# Patient Record
Sex: MALE | Race: WHITE | NOT HISPANIC OR LATINO | Employment: OTHER | URBAN - METROPOLITAN AREA
[De-identification: names, ages, dates, MRNs, and addresses within clinical notes are randomized per-mention and may not be internally consistent; named-entity substitution may affect disease eponyms.]

---

## 2017-10-17 ENCOUNTER — OFFICE VISIT (OUTPATIENT)
Dept: GASTROENTEROLOGY | Facility: CLINIC | Age: 67
End: 2017-10-17

## 2017-10-17 VITALS
TEMPERATURE: 98 F | HEIGHT: 67 IN | BODY MASS INDEX: 24.01 KG/M2 | DIASTOLIC BLOOD PRESSURE: 82 MMHG | SYSTOLIC BLOOD PRESSURE: 128 MMHG | WEIGHT: 153 LBS

## 2017-10-17 DIAGNOSIS — R93.3 ABNORMAL CT SCAN, SMALL BOWEL: ICD-10-CM

## 2017-10-17 DIAGNOSIS — R10.84 GENERALIZED ABDOMINAL PAIN: Primary | ICD-10-CM

## 2017-10-17 PROCEDURE — 99203 OFFICE O/P NEW LOW 30 MIN: CPT | Performed by: INTERNAL MEDICINE

## 2017-10-17 RX ORDER — LORAZEPAM 0.5 MG/1
1 TABLET ORAL
COMMUNITY
Start: 2017-08-27

## 2017-10-17 RX ORDER — LOSARTAN POTASSIUM 100 MG/1
TABLET ORAL
COMMUNITY
Start: 2017-09-24

## 2017-10-17 RX ORDER — ATORVASTATIN CALCIUM 40 MG/1
TABLET, FILM COATED ORAL
COMMUNITY
Start: 2017-09-30

## 2017-10-17 RX ORDER — BUPROPION HYDROCHLORIDE 300 MG/1
TABLET ORAL
COMMUNITY
Start: 2017-09-30

## 2017-10-17 RX ORDER — FLUTICASONE PROPIONATE 50 MCG
2 SPRAY, SUSPENSION (ML) NASAL DAILY
Status: ON HOLD | COMMUNITY
End: 2017-11-13

## 2017-10-17 RX ORDER — TAMSULOSIN HYDROCHLORIDE 0.4 MG/1
CAPSULE ORAL
COMMUNITY
Start: 2017-10-02

## 2017-10-17 RX ORDER — CYCLOSPORINE 0.5 MG/ML
1 EMULSION OPHTHALMIC 2 TIMES DAILY
COMMUNITY

## 2017-10-17 RX ORDER — DULOXETIN HYDROCHLORIDE 30 MG/1
60 CAPSULE, DELAYED RELEASE ORAL DAILY
COMMUNITY
Start: 2017-09-30

## 2017-10-17 NOTE — PROGRESS NOTES
Chief Complaint   Patient presents with   • GI Problem     Intussusception of jejunum     Pardeep Suárez is a 67 y.o. male who presents with a History of abnormal CT and abdominal pain   HPI     Patient 67-year-old male with history of hypertension, hyperlipidemia, allergic rhinitis with recent episode of constipation on vacation.  Patient reports taking 2 Dulcolax after which she began having worsening crampy abdominal pain.  Symptoms became bad enough he ended up in urgent care and was referred to the hospital.  Patient underwent CT scan which revealed intussusception of the proximal jejunum as well as enteritis of the distal small bowel.  Patient was admitted for observation and after additional 24-48 hours was discharged while tolerating diet well with no further pain.  Patient reports since that time was done well with no complaints.  Patient reports no change in bowel habits no nausea vomiting tolerating diet well.    Past Medical History:   Diagnosis Date   • Actinic keratosis    • Acute sinusitis    • Allergic rhinitis    • Anxiety    • Cellulitis of leg    • Depression    • Enlarged prostate without lower urinary tract symptoms (luts)    • Exposure to the flu    • Glaucoma    • Headache    • History of urinary stone    • History of viral hepatitis, type B    • Hyperlipidemia    • Hypertension    • Inability to attain erection    • Insomnia    • Microscopic hematuria    • Nasal polyps    • Onychomycosis        Current Outpatient Prescriptions:   •  atorvastatin (LIPITOR) 40 MG tablet, , Disp: , Rfl:   •  buPROPion XL (WELLBUTRIN XL) 300 MG 24 hr tablet, , Disp: , Rfl:   •  cycloSPORINE (RESTASIS) 0.05 % ophthalmic emulsion, 1 drop 2 (Two) Times a Day., Disp: , Rfl:   •  DULoxetine (CYMBALTA) 30 MG capsule, , Disp: , Rfl:   •  fluticasone (FLONASE) 50 MCG/ACT nasal spray, 2 sprays into each nostril Daily., Disp: , Rfl:   •  LORazepam (ATIVAN) 0.5 MG tablet, , Disp: , Rfl:   •  losartan (COZAAR) 100 MG tablet,  , Disp: , Rfl:   •  Polyethylene Glycol 3350 (MIRALAX PO), Take  by mouth., Disp: , Rfl:   •  tamsulosin (FLOMAX) 0.4 MG capsule 24 hr capsule, , Disp: , Rfl:   No Known Allergies  Social History     Social History   • Marital status:      Spouse name: N/A   • Number of children: N/A   • Years of education: N/A     Occupational History   • Not on file.     Social History Main Topics   • Smoking status: Current Every Day Smoker     Packs/day: 0.50   • Smokeless tobacco: Not on file   • Alcohol use Not on file   • Drug use: Not on file   • Sexual activity: Not on file     Other Topics Concern   • Not on file     Social History Narrative   • No narrative on file     History reviewed. No pertinent family history.  Review of Systems   Constitutional: Negative.    HENT: Negative.    Eyes: Negative.    Respiratory: Negative.    Cardiovascular: Negative.    Gastrointestinal: Positive for constipation. Negative for abdominal distention, abdominal pain, blood in stool, diarrhea, nausea and vomiting.   Endocrine: Negative.    Musculoskeletal: Negative.    Skin: Negative.    Allergic/Immunologic: Positive for environmental allergies. Negative for food allergies and immunocompromised state.   Hematological: Negative.      Vitals:    10/17/17 1357   BP: 128/82   Temp: 98 °F (36.7 °C)     Physical Exam   Constitutional: He is oriented to person, place, and time. He appears well-developed and well-nourished.   HENT:   Head: Normocephalic and atraumatic.   Eyes: Conjunctivae and EOM are normal. No scleral icterus.   Neck: Normal range of motion. No tracheal deviation present.   Cardiovascular: Normal rate and regular rhythm.  Exam reveals no gallop and no friction rub.    No murmur heard.  Pulmonary/Chest: Effort normal and breath sounds normal. No respiratory distress. He has no wheezes. He has no rales.   Abdominal: Soft. Bowel sounds are normal. He exhibits no distension and no mass. There is no tenderness. There is no  rebound and no guarding.   Musculoskeletal: Normal range of motion.   Neurological: He is alert and oriented to person, place, and time.   Skin: Skin is warm and dry.   Psychiatric: He has a normal mood and affect. His behavior is normal.   Nursing note and vitals reviewed.    Diagnoses and all orders for this visit:    Generalized abdominal pain  -     FL small bowel follow through; Future    Abnormal CT scan, small bowel  -     FL small bowel follow through; Future    Other orders  -     tamsulosin (FLOMAX) 0.4 MG capsule 24 hr capsule;   -     LORazepam (ATIVAN) 0.5 MG tablet;   -     losartan (COZAAR) 100 MG tablet;   -     atorvastatin (LIPITOR) 40 MG tablet;   -     buPROPion XL (WELLBUTRIN XL) 300 MG 24 hr tablet;   -     DULoxetine (CYMBALTA) 30 MG capsule;   -     cycloSPORINE (RESTASIS) 0.05 % ophthalmic emulsion; 1 drop 2 (Two) Times a Day.  -     fluticasone (FLONASE) 50 MCG/ACT nasal spray; 2 sprays into each nostril Daily.  -     Polyethylene Glycol 3350 (MIRALAX PO); Take  by mouth.    Patient 67-year-old male with history of allergic rhinitis, hypertension hyperlipidemia presenting after episode of constipation took some Dulcolax and develop progressive abdominal pain.  Patient was seen while on vacation for this severe pain was found on CT with proximal jejunal intussusception and distal small bowel enteritis.  Symptoms resolved overnight patient tolerated diet was discharged home now.  Patient reports since episode lasted only 2 days after it began patient is had no further complaints.  Patient tolerating diet well with no change in bowel habits.  At this point would recommend small bowel follow-through to confirm no lead lesion for the intussusception and resolution of the enteritis.  If questionable whether there is still residual abnormality would consider spiral enteroscopy to evaluate the affected region.

## 2017-10-26 ENCOUNTER — HOSPITAL ENCOUNTER (OUTPATIENT)
Dept: GENERAL RADIOLOGY | Facility: HOSPITAL | Age: 67
Discharge: HOME | End: 2017-10-26
Attending: INTERNAL MEDICINE | Admitting: INTERNAL MEDICINE

## 2017-10-26 DIAGNOSIS — R93.3 ABNORMAL CT SCAN, SMALL BOWEL: ICD-10-CM

## 2017-10-26 DIAGNOSIS — R10.84 GENERALIZED ABDOMINAL PAIN: ICD-10-CM

## 2017-10-26 PROCEDURE — 74250 X-RAY XM SM INT 1CNTRST STD: CPT

## 2017-10-30 ENCOUNTER — TELEPHONE (OUTPATIENT)
Dept: GASTROENTEROLOGY | Facility: CLINIC | Age: 67
End: 2017-10-30

## 2017-10-30 ENCOUNTER — PREP FOR SURGERY (OUTPATIENT)
Dept: OTHER | Facility: HOSPITAL | Age: 67
End: 2017-10-30

## 2017-10-30 DIAGNOSIS — R93.3 ABNORMAL X-RAY OF SMALL BOWEL: Primary | ICD-10-CM

## 2017-10-30 NOTE — TELEPHONE ENCOUNTER
----- Message from Elena Jalloh sent at 10/30/2017 10:47 AM EDT -----  Regarding: PT CALLED FOR SMALL BOWEL FOLLOW THROUGH RESULTS   Contact: [phone]  ...  Notes Recorded by Renard Benoit MD on 10/30/2017 at 11:28 AM  X-ray suggests no further intussusception or upper small bowel abnormality.  Distal bowel with persistent narrowing suggest possible strictures.  Arrange colonoscopy to evaluate his distal ileum.  Patient was called by  to arrange a procedure, patient requested results of his small bowel follow through.  Advised as per Dr. Benoit's note to the patient and his spouse. They both verb understanding and the patient is willing to proceed with colonoscopy.

## 2017-10-31 PROBLEM — R93.3: Status: ACTIVE | Noted: 2017-10-31

## 2017-11-13 ENCOUNTER — ANESTHESIA (OUTPATIENT)
Dept: GASTROENTEROLOGY | Facility: HOSPITAL | Age: 67
End: 2017-11-13

## 2017-11-13 ENCOUNTER — HOSPITAL ENCOUNTER (OUTPATIENT)
Facility: HOSPITAL | Age: 67
Setting detail: HOSPITAL OUTPATIENT SURGERY
Discharge: HOME | End: 2017-11-13
Attending: INTERNAL MEDICINE | Admitting: INTERNAL MEDICINE

## 2017-11-13 ENCOUNTER — ANESTHESIA EVENT (OUTPATIENT)
Dept: GASTROENTEROLOGY | Facility: HOSPITAL | Age: 67
End: 2017-11-13

## 2017-11-13 VITALS
HEART RATE: 50 BPM | OXYGEN SATURATION: 98 % | DIASTOLIC BLOOD PRESSURE: 78 MMHG | WEIGHT: 150.25 LBS | RESPIRATION RATE: 16 BRPM | TEMPERATURE: 98.9 F | HEIGHT: 67 IN | SYSTOLIC BLOOD PRESSURE: 149 MMHG | BODY MASS INDEX: 23.58 KG/M2

## 2017-11-13 DIAGNOSIS — R93.3 ABNORMAL X-RAY OF SMALL BOWEL: ICD-10-CM

## 2017-11-13 LAB
CRP SERPL-MCNC: 0.05 MG/DL (ref 0–0.5)
ERYTHROCYTE [SEDIMENTATION RATE] IN BLOOD: 5 MM/HR (ref 0–20)

## 2017-11-13 PROCEDURE — 88305 TISSUE EXAM BY PATHOLOGIST: CPT | Performed by: INTERNAL MEDICINE

## 2017-11-13 PROCEDURE — 25010000002 PROPOFOL 10 MG/ML EMULSION: Performed by: ANESTHESIOLOGY

## 2017-11-13 PROCEDURE — 85652 RBC SED RATE AUTOMATED: CPT | Performed by: INTERNAL MEDICINE

## 2017-11-13 PROCEDURE — 45380 COLONOSCOPY AND BIOPSY: CPT | Performed by: INTERNAL MEDICINE

## 2017-11-13 PROCEDURE — 86140 C-REACTIVE PROTEIN: CPT | Performed by: INTERNAL MEDICINE

## 2017-11-13 RX ORDER — LIDOCAINE HYDROCHLORIDE 20 MG/ML
INJECTION, SOLUTION INFILTRATION; PERINEURAL AS NEEDED
Status: DISCONTINUED | OUTPATIENT
Start: 2017-11-13 | End: 2017-11-13 | Stop reason: SURG

## 2017-11-13 RX ORDER — PROPOFOL 10 MG/ML
VIAL (ML) INTRAVENOUS CONTINUOUS PRN
Status: DISCONTINUED | OUTPATIENT
Start: 2017-11-13 | End: 2017-11-13 | Stop reason: SURG

## 2017-11-13 RX ORDER — LIDOCAINE HYDROCHLORIDE 10 MG/ML
0.5 INJECTION, SOLUTION INFILTRATION; PERINEURAL ONCE AS NEEDED
Status: DISCONTINUED | OUTPATIENT
Start: 2017-11-13 | End: 2017-11-13 | Stop reason: HOSPADM

## 2017-11-13 RX ORDER — PROPOFOL 10 MG/ML
VIAL (ML) INTRAVENOUS AS NEEDED
Status: DISCONTINUED | OUTPATIENT
Start: 2017-11-13 | End: 2017-11-13 | Stop reason: SURG

## 2017-11-13 RX ORDER — SODIUM CHLORIDE, SODIUM LACTATE, POTASSIUM CHLORIDE, CALCIUM CHLORIDE 600; 310; 30; 20 MG/100ML; MG/100ML; MG/100ML; MG/100ML
1000 INJECTION, SOLUTION INTRAVENOUS CONTINUOUS PRN
Status: DISCONTINUED | OUTPATIENT
Start: 2017-11-13 | End: 2017-11-13 | Stop reason: HOSPADM

## 2017-11-13 RX ORDER — SODIUM CHLORIDE 0.9 % (FLUSH) 0.9 %
3 SYRINGE (ML) INJECTION AS NEEDED
Status: DISCONTINUED | OUTPATIENT
Start: 2017-11-13 | End: 2017-11-13 | Stop reason: HOSPADM

## 2017-11-13 RX ADMIN — PROPOFOL 50 MG: 10 INJECTION, EMULSION INTRAVENOUS at 10:05

## 2017-11-13 RX ADMIN — LIDOCAINE HYDROCHLORIDE 50 MG: 20 INJECTION, SOLUTION INFILTRATION; PERINEURAL at 09:57

## 2017-11-13 RX ADMIN — PROPOFOL 125 MCG/KG/MIN: 10 INJECTION, EMULSION INTRAVENOUS at 10:01

## 2017-11-13 RX ADMIN — PROPOFOL 75 MG: 10 INJECTION, EMULSION INTRAVENOUS at 10:01

## 2017-11-13 RX ADMIN — SODIUM CHLORIDE, POTASSIUM CHLORIDE, SODIUM LACTATE AND CALCIUM CHLORIDE 1000 ML: 600; 310; 30; 20 INJECTION, SOLUTION INTRAVENOUS at 09:45

## 2017-11-13 NOTE — DISCHARGE INSTRUCTIONS
For the next 24 hours patient needs to be with a responsible adult.    For 24 hours DO NOT drive, operate machinery, appliances, drink alcohol, make important decisions or sign legal documents.    Start with a light or bland diet and advance to regular diet as tolerated.    Follow recommendations on procedure report provided by your doctor.    Call Dr Benoit for problems [phone]    Problems may include but not limited to: large amounts of bleeding, trouble breathing, repeated vomiting, severe unrelieved pain, fever or chills.

## 2017-11-13 NOTE — ANESTHESIA POSTPROCEDURE EVALUATION
Patient: Pardeep Suárez    Procedure Summary     Date Anesthesia Start Anesthesia Stop Room / Location    11/13/17 0955 1037  [facility]       Procedure Diagnosis Surgeon Provider    COLONOSCOPY INTO CECUM AND TI WITH BX (N/A ) Abnormal x-ray of small bowel; Diverticulosis; Internal hemorrhoids  (Abnormal x-ray of small bowel [R93.3]) MD Ajit Singh MD          Anesthesia Type: MAC  Last vitals  BP   136/82 (11/13/17 1045)   Temp   37.2 °C (98.9 °F) (11/13/17 1045)   Pulse   52 (11/13/17 1045)   Resp   16 (11/13/17 1045)     SpO2   98 % (11/13/17 1045)     Post Anesthesia Care and Evaluation    Patient location during evaluation: PHASE II  Anesthetic complications: No anesthetic complications

## 2017-11-13 NOTE — ANESTHESIA PREPROCEDURE EVALUATION
Anesthesia Evaluation     Patient summary reviewed          Airway   no difficulty expected  Dental      Pulmonary    Cardiovascular     Rhythm: regular    (+) hypertension,       Neuro/Psych  GI/Hepatic/Renal/Endo      Musculoskeletal     Abdominal    Substance History      OB/GYN          Other                                        Anesthesia Plan    ASA 2     MAC     Anesthetic plan and risks discussed with patient.

## 2017-11-13 NOTE — BRIEF OP NOTE
COLONOSCOPY  Progress Note    Pardeep Suárez  11/13/2017    Pre-op Diagnosis:   Abnormal x-ray of small bowel [R93.3]       Post-Op Diagnosis Codes:     * Abnormal x-ray of small bowel [R93.3]     * Diverticulosis [K57.90]     * Internal hemorrhoids [K64.8]    Procedure/CPT® Codes:      Procedure(s):  COLONOSCOPY INTO CECUM AND TI WITH BX    Surgeon(s):  Renard Benoit MD    Anesthesia: Monitor Anesthesia Care    Staff:   Endo Technician: Emily Vickers  Endo Nurse: Katy Wall RN    Estimated Blood Loss: minimal    Urine Voided: * No values recorded between 11/13/2017  9:53 AM and 11/13/2017 10:33 AM *    Specimens:                  ID Type Source Tests Collected by Time Destination   A : ILEUM BX  Tissue Small Intestine, Ileum TISSUE EXAM Renard Benoit MD 11/13/2017 1024          Drains:           Findings: 67 male history abnormal x-ray  Normal small bowel to 75 cm from ileocecal valve  Diverticulosis of the colon    Complications: None      Renard Benoit MD     Date: 11/13/2017  Time: 10:33 AM

## 2017-11-13 NOTE — PLAN OF CARE
Problem: Patient Care Overview (Adult)  Goal: Plan of Care Review  Outcome: Ongoing (interventions implemented as appropriate)    11/13/17 0914   Coping/Psychosocial Response Interventions   Plan Of Care Reviewed With patient       Goal: Adult Individualization and Mutuality  Outcome: Ongoing (interventions implemented as appropriate)  Goal: Discharge Needs Assessment  Outcome: Ongoing (interventions implemented as appropriate)    11/13/17 0914   Discharge Needs Assessment   Concerns To Be Addressed no discharge needs identified   Discharge Disposition home or self-care   Living Environment   Transportation Available car;family or friend will provide         Problem: GI Endoscopy (Adult)  Goal: Signs and Symptoms of Listed Potential Problems Will be Absent or Manageable (GI Endoscopy)  Outcome: Ongoing (interventions implemented as appropriate)    11/13/17 0914   GI Endoscopy   Problems Assessed (GI Endoscopy) pain;bleeding   Problems Present (GI Endoscopy) none

## 2017-11-14 LAB
LAB AP CASE REPORT: NORMAL
Lab: NORMAL
PATH REPORT.FINAL DX SPEC: NORMAL
PATH REPORT.GROSS SPEC: NORMAL

## 2017-11-30 ENCOUNTER — TELEPHONE (OUTPATIENT)
Dept: GASTROENTEROLOGY | Facility: CLINIC | Age: 67
End: 2017-11-30

## 2018-01-02 NOTE — TELEPHONE ENCOUNTER
No further testing needs to be done.  Patient to call back if constipation occurs but maintain a high-fiber diet.

## 2018-01-02 NOTE — TELEPHONE ENCOUNTER
Patient called, no answer, message left advising of Dr. Benoit's note. Advised to call back for questions.

## 2021-03-09 DIAGNOSIS — Z23 IMMUNIZATION DUE: ICD-10-CM

## 2022-11-14 ENCOUNTER — OFFICE VISIT (OUTPATIENT)
Dept: GASTROENTEROLOGY | Facility: CLINIC | Age: 72
End: 2022-11-14

## 2022-11-14 VITALS
HEART RATE: 71 BPM | HEIGHT: 66 IN | TEMPERATURE: 97.3 F | BODY MASS INDEX: 25.3 KG/M2 | DIASTOLIC BLOOD PRESSURE: 76 MMHG | WEIGHT: 157.4 LBS | SYSTOLIC BLOOD PRESSURE: 139 MMHG

## 2022-11-14 DIAGNOSIS — K59.04 CHRONIC IDIOPATHIC CONSTIPATION: Primary | ICD-10-CM

## 2022-11-14 PROCEDURE — 99204 OFFICE O/P NEW MOD 45 MIN: CPT | Performed by: NURSE PRACTITIONER

## 2022-11-14 RX ORDER — AMLODIPINE BESYLATE 2.5 MG/1
2.5 TABLET ORAL DAILY
COMMUNITY
Start: 2022-10-25

## 2022-11-14 RX ORDER — OMEPRAZOLE 40 MG/1
40 CAPSULE, DELAYED RELEASE ORAL DAILY
COMMUNITY
Start: 2022-08-31

## 2022-11-14 RX ORDER — AMOXICILLIN AND CLAVULANATE POTASSIUM 875; 125 MG/1; MG/1
1 TABLET, FILM COATED ORAL EVERY 12 HOURS
COMMUNITY
Start: 2022-11-05

## 2022-11-14 NOTE — PROGRESS NOTES
Chief Complaint   Patient presents with   • Constipation       HPI    Pardeep Suárez is a  72 y.o. male here to reestablish care as a new over 3-year patient for abdominal pain with constipation.    Patient last seen by Dr. Benoit in 2017.    He was in the emergency room at [facility] last week complaining of constipation with abdominal pain and difficulty urinating.  CT of the abdomen and pelvis with contrast with moderate colonic and predominant rectal stool with rectal distention measuring 6.5 cm concerning for fecal impaction.  Mild stranding of the small bowel mesentery in the right lower quadrant most mildly prominent mesenteric lymph nodes suggestive of panniculitis.  Small hiatal hernia.  He was given MiraLAX and started on Augmentin for possible UTI. WBC 22.2, H/H 14.2/41.     On visit today he reports struggle with lifelong constipation waxing and waning in severity reports more reactive usage of MiraLAX.  He has not been on anything prescription strength for constipation.  More regular bowel movements as of late but does still occasionally get incomplete evacuation.  Denies abdominal pain, rectal pain, rectal bleeding on visit today.    No upper GI complaints.    Repeat white blood cell count 5.8.    Reviewed colonoscopy 2017 --normal ileum, diverticulosis, nonbleeding internal hemorrhoids.  Recall 10 years.  No personal history of colon polyps or family history of colon cancer.    Past Medical History:   Diagnosis Date   • Actinic keratosis    • Acute sinusitis    • Allergic rhinitis    • Anxiety    • Cellulitis of leg    • Depression    • Enlarged prostate without lower urinary tract symptoms (luts)    • Exposure to the flu    • GERD (gastroesophageal reflux disease)    • Glaucoma    • Headache    • Hernia 11-04-22   • History of urinary stone    • History of viral hepatitis, type B    • Hyperlipidemia    • Hypertension    • Inability to attain erection    • Infectious viral hepatitis    • Insomnia    •  Lactose intolerance    • Microscopic hematuria    • Nasal polyps    • Onychomycosis        Past Surgical History:   Procedure Laterality Date   • ACHILLES TENDON REPAIR     • ANAL FISSURECTOMY/FISTULECTOMY     • BREAST SURGERY      Cyst removed   • COLONOSCOPY      Normal per patient   • COLONOSCOPY N/A 11/13/2017    Procedure: COLONOSCOPY INTO CECUM AND TI WITH BX;  Surgeon: Renard Benoit MD;  Location: [facility] ENDOSCOPY;  Service:    • HEMORRHOIDECTOMY     • SHOULDER SURGERY     • SKIN CANCER EXCISION         Scheduled Meds:     Continuous Infusions: No current facility-administered medications for this visit.      PRN Meds:     No Known Allergies    Social History     Socioeconomic History   • Marital status:    Tobacco Use   • Smoking status: Every Day     Packs/day: 0.25     Years: 50.00     Pack years: 12.50     Types: Cigarettes   • Smokeless tobacco: Never   Substance and Sexual Activity   • Alcohol use: Yes   • Drug use: No   • Sexual activity: Defer       History reviewed. No pertinent family history.    Review of Systems   Constitutional: Negative for activity change, appetite change, fatigue and unexpected weight change.   HENT: Negative for trouble swallowing.    Eyes: Negative.    Respiratory: Negative.    Cardiovascular: Negative.    Gastrointestinal: Positive for constipation. Negative for abdominal distention, abdominal pain, anal bleeding, blood in stool, diarrhea, nausea, rectal pain and vomiting.   Endocrine: Negative.    Genitourinary: Negative.    Musculoskeletal: Negative.    Allergic/Immunologic: Negative.    Neurological: Negative.    Hematological: Negative.    Psychiatric/Behavioral: Negative.        Vitals:    11/14/22 1246   BP: 139/76   Pulse: 71   Temp: 97.3 °F (36.3 °C)       Physical Exam  Constitutional:       Appearance: He is well-developed.   Abdominal:      General: Bowel sounds are normal. There is no distension.      Palpations: Abdomen is soft. There is no mass.       Tenderness: There is no abdominal tenderness. There is no guarding.      Hernia: No hernia is present.   Skin:     General: Skin is warm and dry.      Capillary Refill: Capillary refill takes less than 2 seconds.   Neurological:      Mental Status: He is alert and oriented to person, place, and time.   Psychiatric:         Behavior: Behavior normal.     Assessment    Diagnoses and all orders for this visit:    1. Chronic idiopathic constipation (Primary)    Other orders  -     linaclotide (Linzess) 72 MCG capsule capsule; Take 1 capsule by mouth Every Morning Before Breakfast for 14 days.  Dispense: 14 capsule; Refill: 0       Plan    Regarding chronic idiopathic constipation recommend trial of Linzess 72 mcg once daily titrate upward based on response.  Patient to hold MiraLAX while trialing Linzess.  We talked about treatment for constipation including increased physical activity, adequate rest, adequate hydration with 6-8 glasses of water per day, and high fiber diet.   Follow-up 6 weeks.         LACEY Mobley  [facility]  [address] [place] [address]  Office: [phone]

## 2023-01-10 ENCOUNTER — OFFICE VISIT (OUTPATIENT)
Dept: GASTROENTEROLOGY | Facility: CLINIC | Age: 73
End: 2023-01-10
Payer: MEDICARE

## 2023-01-10 VITALS
OXYGEN SATURATION: 96 % | DIASTOLIC BLOOD PRESSURE: 65 MMHG | HEART RATE: 63 BPM | WEIGHT: 152.2 LBS | BODY MASS INDEX: 24.46 KG/M2 | SYSTOLIC BLOOD PRESSURE: 117 MMHG | HEIGHT: 66 IN | TEMPERATURE: 96.2 F

## 2023-01-10 DIAGNOSIS — K59.04 CHRONIC IDIOPATHIC CONSTIPATION: Primary | ICD-10-CM

## 2023-01-10 PROCEDURE — 99213 OFFICE O/P EST LOW 20 MIN: CPT | Performed by: INTERNAL MEDICINE

## 2023-01-10 RX ORDER — PLECANATIDE 3 MG/1
1 TABLET ORAL DAILY
Qty: 30 TABLET | Refills: 11 | Status: SHIPPED | OUTPATIENT
Start: 2023-01-10

## 2023-01-10 NOTE — PROGRESS NOTES
Chief Complaint   Patient presents with   • Constipation   • Abdominal Pain       Pardeep Suárez is a  72 y.o. male here for a follow up visit for chronic idiopathic constipation.    HPI     Patient 72-year-old male with history of history hypertension, hyperlipidemia and GERD complaining of chronic constipation.  Patient has been using MiraLAX but not really getting effective improvement.  On last visit patient was given trial of Linzess but developed nausea and crampy abdominal pain with bloating since that time.  Patient wishing for alternative therapy.    Past Medical History:   Diagnosis Date   • Actinic keratosis    • Acute sinusitis    • Allergic rhinitis    • Anxiety    • Cellulitis of leg    • Depression    • Enlarged prostate without lower urinary tract symptoms (luts)    • Exposure to the flu    • GERD (gastroesophageal reflux disease)    • Glaucoma    • Headache    • Hernia 11-04-22   • History of urinary stone    • History of viral hepatitis, type B    • Hyperlipidemia    • Hypertension    • Inability to attain erection    • Infectious viral hepatitis    • Insomnia    • Lactose intolerance    • Microscopic hematuria    • Nasal polyps    • Onychomycosis          Current Outpatient Medications:   •  amLODIPine (NORVASC) 2.5 MG tablet, Take 1 tablet by mouth Daily., Disp: , Rfl:   •  atorvastatin (LIPITOR) 40 MG tablet, , Disp: , Rfl:   •  buPROPion XL (WELLBUTRIN XL) 300 MG 24 hr tablet, , Disp: , Rfl:   •  Calcium Carbonate (CALCIUM-CARB 600 PO), Take  by mouth As Needed., Disp: , Rfl:   •  cycloSPORINE (RESTASIS) 0.05 % ophthalmic emulsion, 1 drop 2 (Two) Times a Day., Disp: , Rfl:   •  DULoxetine (CYMBALTA) 30 MG capsule, 60 mg Daily., Disp: , Rfl:   •  KRILL OIL PO, Take  by mouth Daily., Disp: , Rfl:   •  LORazepam (ATIVAN) 0.5 MG tablet, Take 1 mg by mouth., Disp: , Rfl:   •  losartan (COZAAR) 100 MG tablet, , Disp: , Rfl:   •  omeprazole (priLOSEC) 40 MG capsule, Take 1 capsule by mouth Daily.,  Disp: , Rfl:   •  Polyethylene Glycol 3350 (MIRALAX PO), Take  by mouth., Disp: , Rfl:   •  tamsulosin (FLOMAX) 0.4 MG capsule 24 hr capsule, , Disp: , Rfl:   •  amoxicillin-clavulanate (AUGMENTIN) 875-125 MG per tablet, Take 1 tablet by mouth Every 12 (Twelve) Hours., Disp: , Rfl:   •  Plecanatide (Trulance) 3 MG tablet, Take 1 tablet by mouth Daily., Disp: 30 tablet, Rfl: 11    No Known Allergies    Social History     Socioeconomic History   • Marital status:    Tobacco Use   • Smoking status: Every Day     Packs/day: 0.25     Years: 50.00     Pack years: 12.50     Types: Cigarettes   • Smokeless tobacco: Never   Substance and Sexual Activity   • Alcohol use: Yes   • Drug use: No   • Sexual activity: Defer       No family history on file.    Review of Systems   Constitutional: Negative.    Respiratory: Negative.    Cardiovascular: Negative.    Gastrointestinal: Positive for abdominal distention, abdominal pain and constipation. Negative for anal bleeding, blood in stool, diarrhea, nausea, rectal pain and vomiting.   Musculoskeletal: Negative.    Skin: Negative.    Hematological: Negative.        Vitals:    01/10/23 1351   BP: 117/65   Pulse: 63   Temp: 96.2 °F (35.7 °C)   SpO2: 96%       Physical Exam  Vitals reviewed.   Constitutional:       Appearance: Normal appearance. He is well-developed and normal weight.   HENT:      Head: Normocephalic and atraumatic.   Eyes:      General: No scleral icterus.     Pupils: Pupils are equal, round, and reactive to light.   Cardiovascular:      Rate and Rhythm: Normal rate and regular rhythm.      Heart sounds: Normal heart sounds.   Pulmonary:      Effort: Pulmonary effort is normal. No respiratory distress.      Breath sounds: Normal breath sounds.   Abdominal:      General: Bowel sounds are normal. There is no distension.      Palpations: Abdomen is soft. There is no mass.      Tenderness: There is no abdominal tenderness.      Hernia: No hernia is present.    Skin:     General: Skin is warm and dry.      Coloration: Skin is not jaundiced or pale.   Neurological:      General: No focal deficit present.      Mental Status: He is alert and oriented to person, place, and time.      Cranial Nerves: No cranial nerve deficit.   Psychiatric:         Behavior: Behavior normal.         Thought Content: Thought content normal.         Judgment: Judgment normal.         No visits with results within 2 Month(s) from this visit.   Latest known visit with results is:   Admission on 11/13/2017, Discharged on 11/13/2017   Component Date Value Ref Range Status   • Case Report 11/13/2017    Final                    Value:Surgical Pathology Report                         Case: [id number]                                  Authorizing Provider:  Renard Benoit MD     Collected:           11/13/2017 10:24 AM          Ordering Location:     [facility]  Received:            11/13/2017 11:16 AM                                 ENDO SUIT[facility]                                                                  Pathologist:           Yan Sparks MD                                                       Specimen:    Small Intestine, Ileum, ILEUM BX                                                          • Final Diagnosis 11/13/2017    Final                    Value:This result contains rich text formatting which cannot be displayed here.   • Gross Description 11/13/2017    Final                    Value:This result contains rich text formatting which cannot be displayed here.   • Sed Rate 11/13/2017 5  0 - 20 mm/hr Final   • C-Reactive Protein 11/13/2017 0.05  0.00 - 0.50 mg/dL Final       Diagnoses and all orders for this visit:    1. Chronic idiopathic constipation (Primary)    Other orders  -     Plecanatide (Trulance) 3 MG tablet; Take 1 tablet by mouth Daily.  Dispense: 30 tablet; Refill: 11      Patient 72-year-old male with history of hypertension, hyperlipidemia and GERD  reports chronic constipation.  Patient reports issues for years dealing with bloating and constipation.  Patient on last visit given trial of Linzess as his previous MiraLAX has never really helped.  Patient was only taking as needed however.  Patient reports with Linzess developed nausea and cramps.  We will begin trial of Trulance and follow-up symptomatically.

## 2023-04-11 ENCOUNTER — OFFICE VISIT (OUTPATIENT)
Dept: GASTROENTEROLOGY | Facility: CLINIC | Age: 73
End: 2023-04-11
Payer: MEDICARE

## 2023-04-11 VITALS
WEIGHT: 139.2 LBS | TEMPERATURE: 98 F | BODY MASS INDEX: 22.37 KG/M2 | DIASTOLIC BLOOD PRESSURE: 55 MMHG | HEART RATE: 57 BPM | OXYGEN SATURATION: 98 % | HEIGHT: 66 IN | SYSTOLIC BLOOD PRESSURE: 108 MMHG

## 2023-04-11 DIAGNOSIS — K59.04 CHRONIC IDIOPATHIC CONSTIPATION: Primary | ICD-10-CM

## 2023-04-11 DIAGNOSIS — R10.33 PERIUMBILICAL ABDOMINAL PAIN: ICD-10-CM

## 2023-04-11 PROCEDURE — 99213 OFFICE O/P EST LOW 20 MIN: CPT | Performed by: INTERNAL MEDICINE

## 2023-04-11 RX ORDER — POLYETHYLENE GLYCOL 3350 17 G/17G
17 POWDER, FOR SOLUTION ORAL DAILY
COMMUNITY

## 2023-04-11 RX ORDER — BUSPIRONE HYDROCHLORIDE 15 MG/1
TABLET ORAL
COMMUNITY
Start: 2023-04-05

## 2023-04-11 RX ORDER — BACLOFEN 20 MG/1
1 TABLET ORAL 3 TIMES DAILY
COMMUNITY
Start: 2023-03-20

## 2023-04-11 RX ORDER — ZOLPIDEM TARTRATE 10 MG/1
TABLET ORAL
COMMUNITY
Start: 2023-03-20

## 2023-04-11 RX ORDER — BUPROPION HYDROCHLORIDE 300 MG/1
1 TABLET ORAL DAILY
COMMUNITY
Start: 2023-03-15

## 2023-04-11 RX ORDER — OMEPRAZOLE 40 MG/1
1 CAPSULE, DELAYED RELEASE ORAL DAILY
COMMUNITY
Start: 2023-03-13

## 2023-04-11 RX ORDER — DULOXETIN HYDROCHLORIDE 60 MG/1
CAPSULE, DELAYED RELEASE ORAL
COMMUNITY
Start: 2023-01-16

## 2023-04-11 NOTE — PROGRESS NOTES
Chief Complaint   Patient presents with   • Abdominal Pain   • Heartburn       Pardeep Suárez is a  72 y.o. male here for a follow up visit for abdominal pain constipation.    HPI     Patient 72-year-old male with history of hypertension, hyperlipidemia and GERD complaining of constipation abdominal pain.  Patient reports the constipation has improved taking MiraLAX daily.  Patient reports usually is soft stool or sometimes soft pieces of stool.  Patient reports no improvement with Trulance.  Patient does report however what he calls gas pain is periumbilical crampiness that occurs usually several hours after lunch.  Pain not related to his bowel movements or obviously his eating.  Patient here for further recommendations.  Patient is nervous and that is causing him to have difficulty sleeping.    Past Medical History:   Diagnosis Date   • Actinic keratosis    • Acute sinusitis    • Allergic rhinitis    • Anxiety    • Cellulitis of leg    • Depression    • Enlarged prostate without lower urinary tract symptoms (luts)    • Exposure to the flu    • GERD (gastroesophageal reflux disease)    • Glaucoma    • Headache    • Hernia 11-04-22   • History of urinary stone    • History of viral hepatitis, type B    • Hyperlipidemia    • Hypertension    • Inability to attain erection    • Infectious viral hepatitis    • Insomnia    • Lactose intolerance    • Microscopic hematuria    • Nasal polyps    • Onychomycosis          Current Outpatient Medications:   •  amLODIPine (NORVASC) 2.5 MG tablet, Take 1 tablet by mouth Daily., Disp: , Rfl:   •  atorvastatin (LIPITOR) 40 MG tablet, , Disp: , Rfl:   •  baclofen (LIORESAL) 20 MG tablet, Take 1 tablet by mouth 3 (Three) Times a Day., Disp: , Rfl:   •  buPROPion XL (WELLBUTRIN XL) 300 MG 24 hr tablet, , Disp: , Rfl:   •  buPROPion XL (WELLBUTRIN XL) 300 MG 24 hr tablet, Take 1 tablet by mouth Daily., Disp: , Rfl:   •  busPIRone (BUSPAR) 15 MG tablet, TAKE 1/2 (ONE-HALF) TABLET BY  MOUTH TWICE DAILY FOR 14 DAYS AND THEN TAKE 1 TABLET TWICE DAILY, Disp: , Rfl:   •  Calcium Carbonate (CALCIUM-CARB 600 PO), Take  by mouth As Needed., Disp: , Rfl:   •  cycloSPORINE (RESTASIS) 0.05 % ophthalmic emulsion, 1 drop 2 (Two) Times a Day., Disp: , Rfl:   •  DULoxetine (CYMBALTA) 30 MG capsule, 2 capsules Daily., Disp: , Rfl:   •  DULoxetine (CYMBALTA) 60 MG capsule, TAKE 1 CAPSULE BY MOUTH ONCE DAILY. DO NOT CRUSH OR CHEW., Disp: , Rfl:   •  hyoscyamine (LEVSIN) 0.125 MG SL tablet, Take 1 tablet by mouth Every 4 (Four) Hours As Needed. For cramps and gas, Disp: 120 tablet, Rfl: 5  •  KRILL OIL PO, Take  by mouth Daily., Disp: , Rfl:   •  losartan (COZAAR) 100 MG tablet, , Disp: , Rfl:   •  omeprazole (priLOSEC) 40 MG capsule, Take 1 capsule by mouth Daily., Disp: , Rfl:   •  omeprazole (priLOSEC) 40 MG capsule, Take 1 capsule by mouth Daily., Disp: , Rfl:   •  polyethylene glycol (MIRALAX) 17 GM/SCOOP powder, Take 17 g by mouth Daily., Disp: , Rfl:   •  Polyethylene Glycol 3350 (MIRALAX PO), Take  by mouth., Disp: , Rfl:   •  tamsulosin (FLOMAX) 0.4 MG capsule 24 hr capsule, , Disp: , Rfl:   •  zolpidem (AMBIEN) 10 MG tablet, TAKE 1/2 TO 1 TABLET 30 MINUTES PRIOR TO BEDTIME AS NEEDED FOR SLEEP, Disp: , Rfl:   •  amoxicillin-clavulanate (AUGMENTIN) 875-125 MG per tablet, Take 1 tablet by mouth Every 12 (Twelve) Hours., Disp: , Rfl:   •  LORazepam (ATIVAN) 0.5 MG tablet, Take 1 mg by mouth., Disp: , Rfl:   •  Plecanatide (Trulance) 3 MG tablet, Take 1 tablet by mouth Daily. (Patient not taking: Reported on 4/11/2023), Disp: 30 tablet, Rfl: 11    No Known Allergies    Social History     Socioeconomic History   • Marital status:    Tobacco Use   • Smoking status: Every Day     Packs/day: 0.25     Years: 50.00     Pack years: 12.50     Types: Cigarettes   • Smokeless tobacco: Never   Substance and Sexual Activity   • Alcohol use: Not Currently   • Drug use: No   • Sexual activity: Defer       No family  history on file.    Review of Systems   Constitutional: Negative.    Respiratory: Negative.    Cardiovascular: Negative.    Gastrointestinal: Positive for abdominal pain. Negative for abdominal distention, anal bleeding, blood in stool, constipation, diarrhea, nausea, rectal pain and vomiting.   Musculoskeletal: Negative.    Skin: Negative.    Hematological: Negative.        Vitals:    04/11/23 1258   BP: 108/55   Pulse: 57   Temp: 98 °F (36.7 °C)   SpO2: 98%       Physical Exam  Vitals reviewed.   Constitutional:       Appearance: Normal appearance. He is well-developed.   HENT:      Head: Normocephalic and atraumatic.   Eyes:      General: No scleral icterus.     Pupils: Pupils are equal, round, and reactive to light.   Cardiovascular:      Rate and Rhythm: Normal rate and regular rhythm.      Heart sounds: Normal heart sounds.   Pulmonary:      Effort: Pulmonary effort is normal. No respiratory distress.      Breath sounds: Normal breath sounds.   Abdominal:      General: Bowel sounds are normal. There is no distension.      Palpations: Abdomen is soft.      Tenderness: There is no abdominal tenderness.   Skin:     General: Skin is warm and dry.      Coloration: Skin is not jaundiced.      Findings: No rash.   Neurological:      General: No focal deficit present.      Mental Status: He is alert and oriented to person, place, and time.      Cranial Nerves: No cranial nerve deficit.   Psychiatric:         Mood and Affect: Mood normal.         Behavior: Behavior normal.         Thought Content: Thought content normal.         No visits with results within 2 Month(s) from this visit.   Latest known visit with results is:   Admission on 11/13/2017, Discharged on 11/13/2017   Component Date Value Ref Range Status   • Case Report 11/13/2017    Final                    Value:Surgical Pathology Report                         Case: [id number]                                  Authorizing Provider:  Renard Benoit MD      Collected:           11/13/2017 10:24 AM          Ordering Location:     [facility]  Received:            11/13/2017 11:16 AM                                 ENDO SUITES                                                                  Pathologist:           Yan Sparks MD                                                       Specimen:    Small Intestine, Ileum, ILEUM BX                                                          • Final Diagnosis 11/13/2017    Final                    Value:This result contains rich text formatting which cannot be displayed here.   • Gross Description 11/13/2017    Final                    Value:This result contains rich text formatting which cannot be displayed here.   • Sed Rate 11/13/2017 5  0 - 20 mm/hr Final   • C-Reactive Protein 11/13/2017 0.05  0.00 - 0.50 mg/dL Final       Diagnoses and all orders for this visit:    1. Chronic idiopathic constipation (Primary)    2. Periumbilical abdominal pain    Other orders  -     Discontinue: hyoscyamine (LEVSIN) 0.125 MG SL tablet; Take 1 tablet by mouth 4 (Four) Times a Day With Meals & at Bedtime.  Dispense: 120 tablet; Refill: 5  -     hyoscyamine (LEVSIN) 0.125 MG SL tablet; Take 1 tablet by mouth Every 4 (Four) Hours As Needed. For cramps and gas  Dispense: 120 tablet; Refill: 5      Patient 72-year-old male with history hypertension, hyperlipidemia and GERD with chronic constipation doing better and bowel movements using MiraLAX daily.  Patient reports his stools are soft but still having crampy periumbilical discomfort particularly in the afternoon.  Patient reports pain not related to actually eating usually several hours after meals he begins to get this crampy discomfort.  Patient reports unrelated to his bowel movements as they still remain soft.  Patient denies any fever chills no parable per rectum or melena.  At this point would recommend patient give trial of Levsin for his symptoms.  We will  follow-up clinically based on response to medication.

## 2023-06-05 ENCOUNTER — TELEPHONE (OUTPATIENT)
Dept: GASTROENTEROLOGY | Facility: CLINIC | Age: 73
End: 2023-06-05
Payer: MEDICARE

## 2023-06-06 NOTE — TELEPHONE ENCOUNTER
Returned phone call to Maren. Requested medical records from the last office visit and lab work.     F/u office visit scheduled with Natalia on 09/12@9654

## 2023-06-07 ENCOUNTER — TELEPHONE (OUTPATIENT)
Dept: GASTROENTEROLOGY | Facility: CLINIC | Age: 73
End: 2023-06-07
Payer: MEDICARE

## 2023-06-07 NOTE — TELEPHONE ENCOUNTER
----- Message from Renard Benoit MD sent at 6/7/2023  1:46 PM EDT -----  Regarding: FW: Pardeep Adrien Suárez jr. 1950  Contact: [phone]  There are no notes or labs from Dr. Brayd, can we get a hold of those please  ----- Message -----  From: Beatrice Long, RN  Sent: 6/7/2023  12:06 PM EDT  To: Renard Benoit MD  Subject: FW: Pardeep Adrien Suárez jr. 1950               ----- Message -----  From: Pardeep Suárez  Sent: 6/7/2023   9:50 AM EDT  To: [facility]  Subject: Pardeep Suárez jr. 1950                 Tests by Dr. imelda Rossi indicated anemia, weight loss (20 lbs since January) constipation.   Referral for endoscopy and colonoscopy are requested ASAP.   Please schedule tests and advise of date  [phone].   Thank you for your prompt response

## 2023-06-13 ENCOUNTER — TELEPHONE (OUTPATIENT)
Dept: GASTROENTEROLOGY | Facility: CLINIC | Age: 73
End: 2023-06-13
Payer: MEDICARE

## 2023-06-13 DIAGNOSIS — R63.4 WEIGHT LOSS, ABNORMAL: ICD-10-CM

## 2023-06-13 DIAGNOSIS — Z12.11 ENCOUNTER FOR SCREENING FOR MALIGNANT NEOPLASM OF COLON: Primary | ICD-10-CM

## 2023-06-13 DIAGNOSIS — K21.9 GASTROESOPHAGEAL REFLUX DISEASE, UNSPECIFIED WHETHER ESOPHAGITIS PRESENT: ICD-10-CM

## 2023-12-18 ENCOUNTER — OFFICE VISIT (OUTPATIENT)
Dept: ORTHOPEDIC SURGERY | Facility: CLINIC | Age: 73
End: 2023-12-18
Payer: MEDICARE

## 2023-12-18 VITALS
HEART RATE: 48 BPM | WEIGHT: 135 LBS | SYSTOLIC BLOOD PRESSURE: 121 MMHG | BODY MASS INDEX: 21.69 KG/M2 | DIASTOLIC BLOOD PRESSURE: 69 MMHG | HEIGHT: 66 IN

## 2023-12-18 DIAGNOSIS — M25.512 LEFT SHOULDER PAIN, UNSPECIFIED CHRONICITY: Primary | ICD-10-CM

## 2023-12-18 DIAGNOSIS — M19.012 PRIMARY OSTEOARTHRITIS OF LEFT SHOULDER: ICD-10-CM

## 2023-12-18 DIAGNOSIS — M19.011 PRIMARY OSTEOARTHRITIS OF RIGHT SHOULDER: ICD-10-CM

## 2023-12-18 RX ORDER — CELECOXIB 200 MG/1
200 CAPSULE ORAL DAILY
COMMUNITY

## 2023-12-18 RX ORDER — FINASTERIDE 5 MG/1
1 TABLET, FILM COATED ORAL DAILY
COMMUNITY
Start: 2023-12-03

## 2023-12-18 RX ORDER — FLUOXETINE HYDROCHLORIDE 20 MG/1
20 CAPSULE ORAL EVERY MORNING
COMMUNITY

## 2023-12-18 RX ORDER — NALOXONE HYDROCHLORIDE 4 MG/.1ML
4 SPRAY NASAL
COMMUNITY
Start: 2023-09-21 | End: 2024-09-20

## 2023-12-18 RX ADMIN — TRIAMCINOLONE ACETONIDE 80 MG: 40 INJECTION, SUSPENSION INTRA-ARTICULAR; INTRAMUSCULAR at 15:18

## 2023-12-18 RX ADMIN — LIDOCAINE HYDROCHLORIDE 4 ML: 10 INJECTION, SOLUTION EPIDURAL; INFILTRATION; INTRACAUDAL; PERINEURAL at 15:18

## 2023-12-18 NOTE — PROGRESS NOTES
Subjective:     Patient ID: Pardeep Suárez is a 73 y.o. male.    Chief Complaint:  Bilateral shoulder pain, new patient    History of Present Illness  Pardeep Suárez presents to clinic today for evaluation of bilateral shoulder pain.     He had a rotator cuff surgery on his right 50 years ago per his report. His primary issue now is his left side, which is slightly worse than his right. He had an acute episode of onset of pain on his left side approximately 6 weeks ago when he was lifting some cans for a neighbor and felt a sharp pop and pain, particularly over the lateral aspect of his shoulder. His pain is localized primarily on the lateral subacromial space bilaterally, moderate in intensity, occasionally does get severe, aching in nature, worse with overhead lifting, pushing, and pulling activities as well as with resistance. He denies any numbness or tingling. He denies any fevers, chills, or sweats. He notes minimal improvement with activity modification, over the counter anti-inflammatory medications, rest, soft tissue massage. He has had no prior injections to either shoulder at this time.     Social History     Occupational History    Not on file   Tobacco Use    Smoking status: Every Day     Packs/day: 0.25     Years: 50.00     Additional pack years: 0.00     Total pack years: 12.50     Types: Cigarettes     Passive exposure: Current    Smokeless tobacco: Never   Vaping Use    Vaping Use: Never used   Substance and Sexual Activity    Alcohol use: Not Currently    Drug use: No    Sexual activity: Yes     Partners: Female      Past Medical History:   Diagnosis Date    Actinic keratosis     Acute sinusitis     Allergic rhinitis     Anxiety     Arthritis of back     Arthritis of neck     Cellulitis of leg     Depression     Enlarged prostate without lower urinary tract symptoms (luts)     Exposure to the flu     GERD (gastroesophageal reflux disease)     Glaucoma     Headache     Hernia 11-04-22    History  "of urinary stone     History of viral hepatitis, type B     Hyperlipidemia     Hypertension     Inability to attain erection     Infectious viral hepatitis     Insomnia     Lactose intolerance     Microscopic hematuria     Nasal polyps     Onychomycosis     Periarthritis of shoulder     Rotator cuff syndrome 1970    Tennis elbow     Thoracic disc disorder      Past Surgical History:   Procedure Laterality Date    ACHILLES TENDON REPAIR Right     ANAL FISSURECTOMY/FISTULECTOMY      BREAST SURGERY      Cyst removed    COLONOSCOPY      Normal per patient    COLONOSCOPY N/A 11/13/2017    Procedure: COLONOSCOPY INTO CECUM AND TI WITH BX;  Surgeon: Renard Benoit MD;  Location:  [facility];  Service:     COLONOSCOPY N/A 07/03/2023    Procedure: COLONOSCOPY to cecum into terminal ileum;  Surgeon: Renard Benoit MD;  Location:  [facility];  Service: Gastroenterology;  Laterality: N/A;  pre- anemia, weight loss  post-diverticulosis, hemorrhoids    ENDOSCOPY N/A 07/03/2023    Procedure: ESOPHAGOGASTRODUODENOSCOPY;  Surgeon: Renard Benoit MD;  Location: [facility];  Service: Gastroenterology;  Laterality: N/A;  pre- anemia, weight loss  post-hiatal hernia    HEMORRHOIDECTOMY      SHOULDER SURGERY Right     SKIN CANCER EXCISION      TONSILLECTOMY         Family History   Problem Relation Age of Onset    Cancer Mother         Brain tumor    Cancer Father         Pancreatic cancer         Review of Systems        Objective:  Vitals:    12/18/23 1419   BP: 121/69   Pulse: (!) 48   Weight: 61.2 kg (135 lb)   Height: 167.6 cm (66\")         12/18/23  1419   Weight: 61.2 kg (135 lb)     Body mass index is 21.79 kg/m².  Physical Exam    Vital signs reviewed.   General: No acute distress, alert and oriented  Eyes: conjunctiva clear; pupils equally round and reactive  ENT: external ears and nose atraumatic; oropharynx clear  CV: no peripheral edema  Resp: normal respiratory effort  Skin: no rashes or " wounds; normal turgor  Psych: mood and affect appropriate; recent and remote memory intact        Ortho Exam       Bilateral shoulder:    Maximal tenderness to palpation lateral subacromial space.  Moderate tenderness along the anterior and posterior glenohumeral joint line with crepitus.  Active FF- is to 165 degrees.  Strength- 4-/5.  Active ER- is to 40 degrees.  Strength- 4/5.  IR- L2.  Strength on Belly Press test- 4+/5.  Empty can test- Positive.  Drop arm test- Negative.  Ext rotation lag sign- Negative.  Neer's test- Positive.  Hawkin's test- Positive.  Brisk cap refill to all digits.  1+ radial pulse, bilateral wrists.  Positive sensation to light touch palmar, dorsal aspects of small and index fingers and anatomic snuffbox bilateral hands and symmetric.    Imaging:  Left Shoulder X-Ray  Indication: Pain  AP, scapular Y, and axillary lateral views    Findings:  No fracture  No bony lesion  Normal soft tissues  Mild glenohumeral joint space narrowing with moderate humeral head elevation appreciated, no evidence of significant reactive osteophyte formation.    No prior studies were available for comparison.    Assessment:        1. Left shoulder pain, unspecified chronicity    2. Primary osteoarthritis of left shoulder    3. Primary osteoarthritis of right shoulder           Plan:  - Large Joint Arthrocentesis: bilateral subacromial bursa on 12/18/2023 3:18 PM  Indications: pain  Details: 22 G needle, lateral approach  Medications (Right): 80 mg triamcinolone acetonide 40 MG/ML; 4 mL lidocaine PF 1% 1 %  Medications (Left): 80 mg triamcinolone acetonide 40 MG/ML; 4 mL lidocaine PF 1% 1 %  Outcome: tolerated well, no immediate complications  Procedure, treatment alternatives, risks and benefits explained, specific risks discussed. Consent was given by the patient. Immediately prior to procedure a time out was called to verify the correct patient, procedure, equipment, support staff and site/side marked as  required. Patient was prepped and draped in the usual sterile fashion.            Last office visit with prescribing clinician: Visit date not found      Next office visit with prescribing clinician: Visit date not found   Last Filled:    Encounter Diagnoses   Name Primary?    Left shoulder pain, unspecified chronicity Yes    Primary osteoarthritis of left shoulder     Primary osteoarthritis of right shoulder       Date of Surgery:      Ashwin Turner MD  12/20/23, 17:38 EST    Previous RX pended for your approval, change or denial.     {TIP  Encounters:    {TIP  Please add Last Relevant Lab Date if appropriate:  {TIP  Is Refill Pharmacy correct?:          1. I discussed treatment options at length with patient at today's visit.  2. At this point in time, given his significance of pain, he would like to proceed with injection on 12/18/2023. I did discuss with him that he does have some degenerative change on his imaging of his left shoulder and he also has some humeral head elevation, which is likely consistent with some rotator cuff insufficiency on that side. I did offer him advanced imaging of one or both shoulders, but he has declined that at this time.  3. He will follow up in 3 months or sooner if needed. Recommend he continue to work on home exercises for range of motion and strength as tolerated. If he fails to get significant improvement with this injection, may consider glenohumeral injection.  4. Patient would like to proceed with cortisone injection today to the bilateral shoulder subacromial space. Recommended limited use of affected extremity for the next 24 hours to only essential activites other than work on general active and passive motion. Recommended supplementing with ice and soft tissue massage. Discussed with patient that they should see results in 5-7 days, if no improvement in 5-6 weeks I have asked them to call the office to review other options. Patient should call office  immediately if they notice redness, warmth, fevers, chills, or residual numbness or tingling for greater than 6 hours after injection.         Pardeep Suárez was in agreement with plan and had all questions answered.     Orders:  Orders Placed This Encounter   Procedures    - Large Joint Arthrocentesis: bilateral subacromial bursa    XR Shoulder 2+ View Left       Medications:  No orders of the defined types were placed in this encounter.      Followup:  Return in about 3 months (around 3/18/2024).         Dictated utilizing Dragon dictation     Transcribed from ambient dictation for Ashwin Turner MD by Otilia Max.  12/18/23   16:09 EST    Patient or patient representative verbalized consent to the visit recording.  I have personally performed the services described in this document as transcribed by the above individual, and it is both accurate and complete.

## 2023-12-20 RX ORDER — TRIAMCINOLONE ACETONIDE 40 MG/ML
80 INJECTION, SUSPENSION INTRA-ARTICULAR; INTRAMUSCULAR
Status: COMPLETED | OUTPATIENT
Start: 2023-12-18 | End: 2023-12-18

## 2023-12-20 RX ORDER — LIDOCAINE HYDROCHLORIDE 10 MG/ML
4 INJECTION, SOLUTION EPIDURAL; INFILTRATION; INTRACAUDAL; PERINEURAL
Status: COMPLETED | OUTPATIENT
Start: 2023-12-18 | End: 2023-12-18

## 2024-03-20 ENCOUNTER — OFFICE VISIT (OUTPATIENT)
Dept: ORTHOPEDIC SURGERY | Facility: CLINIC | Age: 74
End: 2024-03-20
Payer: MEDICARE

## 2024-03-20 VITALS — BODY MASS INDEX: 21.79 KG/M2 | HEIGHT: 66 IN

## 2024-03-20 DIAGNOSIS — M19.012 PRIMARY OSTEOARTHRITIS OF LEFT SHOULDER: Primary | ICD-10-CM

## 2024-03-20 DIAGNOSIS — M19.011 PRIMARY OSTEOARTHRITIS OF RIGHT SHOULDER: ICD-10-CM

## 2024-03-20 RX ORDER — TRIAMCINOLONE ACETONIDE 40 MG/ML
80 INJECTION, SUSPENSION INTRA-ARTICULAR; INTRAMUSCULAR
Status: COMPLETED | OUTPATIENT
Start: 2024-03-20 | End: 2024-03-20

## 2024-03-20 RX ORDER — LIDOCAINE HYDROCHLORIDE 10 MG/ML
4 INJECTION, SOLUTION EPIDURAL; INFILTRATION; INTRACAUDAL; PERINEURAL
Status: COMPLETED | OUTPATIENT
Start: 2024-03-20 | End: 2024-03-20

## 2024-03-20 RX ADMIN — LIDOCAINE HYDROCHLORIDE 4 ML: 10 INJECTION, SOLUTION EPIDURAL; INFILTRATION; INTRACAUDAL; PERINEURAL at 14:03

## 2024-03-20 RX ADMIN — TRIAMCINOLONE ACETONIDE 80 MG: 40 INJECTION, SUSPENSION INTRA-ARTICULAR; INTRAMUSCULAR at 14:03

## 2024-03-20 NOTE — PROGRESS NOTES
Subjective:     Patient ID: Pardeep Suárez is a 73 y.o. male.    Chief Complaint:  Follow-up bilateral shoulder pain, DJD  Last injection bilateral shoulder subacromial bursa-12/18/2023    History of Present Illness  Pardeep Suárez returns to clinic today for a follow up on his bilateral shoulders.    Over the last couple of weeks, he has had increasing levels of pain. He had good improvement with last injection, lasted for about 6 weeks. Over the last 6 weeks, he has had slowly increasing tapering pain primarily to the lateral subacromial space bilaterally. He denies any numbness or tingling. He denies any fevers, chills, or sweats. He continues to work on range of motion on his own at home. He will be attending physical therapy in the next week or so. He does note some moderate night pain.      Social History     Occupational History    Not on file   Tobacco Use    Smoking status: Every Day     Current packs/day: 0.25     Average packs/day: 0.3 packs/day for 50.0 years (12.5 ttl pk-yrs)     Types: Cigarettes     Passive exposure: Current    Smokeless tobacco: Never   Vaping Use    Vaping status: Never Used   Substance and Sexual Activity    Alcohol use: Not Currently    Drug use: No    Sexual activity: Yes     Partners: Female      Past Medical History:   Diagnosis Date    Actinic keratosis     Acute sinusitis     Allergic rhinitis     Anxiety     Arthritis of back     Arthritis of neck     Cellulitis of leg     Depression     Enlarged prostate without lower urinary tract symptoms (luts)     Exposure to the flu     GERD (gastroesophageal reflux disease)     Glaucoma     Headache     Hernia 11-04-22    History of urinary stone     History of viral hepatitis, type B     Hyperlipidemia     Hypertension     Inability to attain erection     Infectious viral hepatitis     Insomnia     Lactose intolerance     Microscopic hematuria     Nasal polyps     Onychomycosis     Periarthritis of shoulder     Rotator cuff  "syndrome 1970    Tennis elbow     Thoracic disc disorder      Past Surgical History:   Procedure Laterality Date    ACHILLES TENDON REPAIR Right     ANAL FISSURECTOMY/FISTULECTOMY      BREAST SURGERY      Cyst removed    COLONOSCOPY      Normal per patient    COLONOSCOPY N/A 11/13/2017    Procedure: COLONOSCOPY INTO CECUM AND TI WITH BX;  Surgeon: Renard Benoit MD;  Location: [facility] ENDOSCOPY;  Service:     COLONOSCOPY N/A 07/03/2023    Procedure: COLONOSCOPY to cecum into terminal ileum;  Surgeon: Renard Benoit MD;  Location: [facility] ENDOSCOPY;  Service: Gastroenterology;  Laterality: N/A;  pre- anemia, weight loss  post-diverticulosis, hemorrhoids    ENDOSCOPY N/A 07/03/2023    Procedure: ESOPHAGOGASTRODUODENOSCOPY;  Surgeon: Renard Benoit MD;  Location: [facility] ENDOSCOPY;  Service: Gastroenterology;  Laterality: N/A;  pre- anemia, weight loss  post-hiatal hernia    HEMORRHOIDECTOMY      SHOULDER SURGERY Right     SKIN CANCER EXCISION      TONSILLECTOMY         Family History   Problem Relation Age of Onset    Cancer Mother         Brain tumor    Cancer Father         Pancreatic cancer         Review of Systems        Objective:  Vitals:    03/20/24 1305   Height: 167.6 cm (66\")     There were no vitals filed for this visit.  Body mass index is 21.79 kg/m².  General: No acute distress.  Resp: normal respiratory effort  Skin: no rashes or wounds; normal turgor  Psych: mood and affect appropriate; recent and remote memory intact        Ortho Exam     Bilateral shoulder:    Maximal tenderness to palpation lateral subacromial space.  Moderate tenderness along the anterior and posterior glenohumeral joint line with crepitus.  Active FF- is to 165 degrees.  Strength- 4-/5.  Active ER- is to 40 degrees.  Strength- 4/5.  IR- L2.  Strength on Belly Press test- 4+/5.  Empty can test- Positive.  Drop arm test- Negative.  Ext rotation lag sign- Negative.  Neer's test- Positive.  Hawkin's test- " Positive.  Brisk cap refill to all digits.  1+ radial pulse, bilateral wrists.  Positive sensation to light touch palmar, dorsal aspects of small and index fingers and anatomic snuffbox bilateral hands and symmetric.    Imaging:  None today  Assessment:        1. Primary osteoarthritis of left shoulder    2. Primary osteoarthritis of right shoulder           Plan:    - Large Joint Arthrocentesis: bilateral subacromial bursa on 3/20/2024 2:03 PM  Indications: pain  Details: 22 G needle, lateral approach  Medications (Right): 4 mL lidocaine PF 1% 1 %; 80 mg triamcinolone acetonide 40 MG/ML  Medications (Left): 4 mL lidocaine PF 1% 1 %; 80 mg triamcinolone acetonide 40 MG/ML  Outcome: tolerated well, no immediate complications  Procedure, treatment alternatives, risks and benefits explained, specific risks discussed. Consent was given by the patient. Immediately prior to procedure a time out was called to verify the correct patient, procedure, equipment, support staff and site/side marked as required. Patient was prepped and draped in the usual sterile fashion.             1. Discussed treatment options at length with patient at today's visit.   2. At this point in time, given recurrence of pain, he would like to proceed with repeat injections today. We did discuss options for shoulder arthroplasty as well as home exercise program. He is also going to pursue physical therapy as noted above.   3. Patient would like to proceed with cortisone injection today to the bilateral shoulder subacromial space. Recommended limited use of affected extremity for the next 24 hours to only essential activities other than work on general active and passive motion. Recommended supplementing with ice and soft tissue massage. Discussed with patient that they should see results in 5-7 days, if no improvement in 5-6 weeks I have asked them to call the office to review other options. Patient should call office immediately if they notice  redness, warmth, fevers, chills, or residual numbness or tingling for greater than 6 hours after injection.  4. The patient will follow up in 3 months or sooner if needed.      Pardeep Suárez was in agreement with plan and had all questions answered.     Orders:  Orders Placed This Encounter   Procedures    - Large Joint Arthrocentesis: bilateral subacromial bursa       Medications:  No orders of the defined types were placed in this encounter.      Followup:  Return in about 3 months (around 6/20/2024).    Diagnoses and all orders for this visit:    1. Primary osteoarthritis of left shoulder (Primary)  -     Cancel: - Large Joint Arthrocentesis: L subacromial bursa    2. Primary osteoarthritis of right shoulder    Other orders  -     - Large Joint Arthrocentesis: bilateral subacromial bursa          Dictated utilizing Dragon dictation     Transcribed from ambient dictation for Ashwin Turner MD by Rocío Ervin.  03/20/24   14:44 EDT    Patient or patient representative verbalized consent to the visit recording.  I have personally performed the services described in this document as transcribed by the above individual, and it is both accurate and complete.

## 2024-06-26 ENCOUNTER — OFFICE VISIT (OUTPATIENT)
Dept: ORTHOPEDIC SURGERY | Facility: CLINIC | Age: 74
End: 2024-06-26
Payer: MEDICARE

## 2024-06-26 VITALS — WEIGHT: 147 LBS | HEIGHT: 66 IN | BODY MASS INDEX: 23.63 KG/M2

## 2024-06-26 DIAGNOSIS — M19.011 PRIMARY OSTEOARTHRITIS OF RIGHT SHOULDER: Primary | ICD-10-CM

## 2024-06-26 DIAGNOSIS — M19.012 PRIMARY OSTEOARTHRITIS OF LEFT SHOULDER: ICD-10-CM

## 2024-06-26 PROCEDURE — 73030 X-RAY EXAM OF SHOULDER: CPT | Performed by: ORTHOPAEDIC SURGERY

## 2024-06-26 PROCEDURE — 1159F MED LIST DOCD IN RCRD: CPT | Performed by: ORTHOPAEDIC SURGERY

## 2024-06-26 PROCEDURE — 20610 DRAIN/INJ JOINT/BURSA W/O US: CPT | Performed by: ORTHOPAEDIC SURGERY

## 2024-06-26 PROCEDURE — 99213 OFFICE O/P EST LOW 20 MIN: CPT | Performed by: ORTHOPAEDIC SURGERY

## 2024-06-26 PROCEDURE — 1160F RVW MEDS BY RX/DR IN RCRD: CPT | Performed by: ORTHOPAEDIC SURGERY

## 2024-06-26 RX ORDER — TRIAMCINOLONE ACETONIDE 40 MG/ML
80 INJECTION, SUSPENSION INTRA-ARTICULAR; INTRAMUSCULAR
Status: COMPLETED | OUTPATIENT
Start: 2024-06-26 | End: 2024-06-26

## 2024-06-26 RX ORDER — LIDOCAINE HYDROCHLORIDE 10 MG/ML
4 INJECTION, SOLUTION EPIDURAL; INFILTRATION; INTRACAUDAL; PERINEURAL
Status: COMPLETED | OUTPATIENT
Start: 2024-06-26 | End: 2024-06-26

## 2024-06-26 RX ORDER — MELOXICAM 15 MG/1
15 TABLET ORAL DAILY
Qty: 30 TABLET | Refills: 0 | Status: SHIPPED | OUTPATIENT
Start: 2024-06-26

## 2024-06-26 RX ADMIN — LIDOCAINE HYDROCHLORIDE 4 ML: 10 INJECTION, SOLUTION EPIDURAL; INFILTRATION; INTRACAUDAL; PERINEURAL at 12:05

## 2024-06-26 RX ADMIN — TRIAMCINOLONE ACETONIDE 80 MG: 40 INJECTION, SUSPENSION INTRA-ARTICULAR; INTRAMUSCULAR at 12:05

## 2024-06-26 NOTE — PROGRESS NOTES
Subjective:     Patient ID: Pardeep Suárez is a 74 y.o. male.    Chief Complaint:  Follow-up bilateral shoulder pain, DJD  Last injection-bilateral shoulder subacromial bursa-3/20/2024    History of Present Illness  History of Present Illness  The patient returns to clinic today for follow-up evaluation in regards to bilateral shoulder pain.    The patient reports that his most recent injection provided significant relief, lasting approximately 2.5 months. Over the past 1 to 2 weeks, he has experienced an escalation in pain levels. His right shoulder, which is slightly more severely affected than the left, is particularly localized over the anterior and lateral aspects of the shoulder. He quantifies the pain as moderate in intensity, occasionally severe, particularly during overhead lifting, pushing, and pulling activities. He also experiences mild aching pain at night. He continues to engage in physical therapy for range of motion and strength as tolerated. He denies any associated numbness or tingling. He also denies any current radiation of the pain.     Social History     Occupational History    Not on file   Tobacco Use    Smoking status: Every Day     Current packs/day: 0.25     Average packs/day: 0.3 packs/day for 50.0 years (12.5 ttl pk-yrs)     Types: Cigarettes     Passive exposure: Current    Smokeless tobacco: Never   Vaping Use    Vaping status: Never Used   Substance and Sexual Activity    Alcohol use: Not Currently    Drug use: No    Sexual activity: Yes     Partners: Female      Past Medical History:   Diagnosis Date    Actinic keratosis     Acute sinusitis     Allergic rhinitis     Anxiety     Arthritis of back     Arthritis of neck     Cellulitis of leg     Depression     Enlarged prostate without lower urinary tract symptoms (luts)     Exposure to the flu     GERD (gastroesophageal reflux disease)     Glaucoma     Headache     Hernia 11-04-22    History of urinary stone     History of viral  "hepatitis, type B     Hyperlipidemia     Hypertension     Inability to attain erection     Infectious viral hepatitis     Insomnia     Lactose intolerance     Microscopic hematuria     Nasal polyps     Onychomycosis     Periarthritis of shoulder     Rotator cuff syndrome 1970    Tennis elbow     Thoracic disc disorder      Past Surgical History:   Procedure Laterality Date    ACHILLES TENDON REPAIR Right     ANAL FISSURECTOMY/FISTULECTOMY      BREAST SURGERY      Cyst removed    COLONOSCOPY      Normal per patient    COLONOSCOPY N/A 11/13/2017    Procedure: COLONOSCOPY INTO CECUM AND TI WITH BX;  Surgeon: Renard Benoit MD;  Location: [facility] ENDOSCOPY;  Service:     COLONOSCOPY N/A 07/03/2023    Procedure: COLONOSCOPY to cecum into terminal ileum;  Surgeon: Renard Benoit MD;  Location: [facility] ENDOSCOPY;  Service: Gastroenterology;  Laterality: N/A;  pre- anemia, weight loss  post-diverticulosis, hemorrhoids    ENDOSCOPY N/A 07/03/2023    Procedure: ESOPHAGOGASTRODUODENOSCOPY;  Surgeon: Renard Benoit MD;  Location: [facility] ENDOSCOPY;  Service: Gastroenterology;  Laterality: N/A;  pre- anemia, weight loss  post-hiatal hernia    HEMORRHOIDECTOMY      SHOULDER SURGERY Right     SKIN CANCER EXCISION      TONSILLECTOMY         Family History   Problem Relation Age of Onset    Cancer Mother         Brain tumor    Cancer Father         Pancreatic cancer         Review of Systems        Objective:  Vitals:    06/26/24 1137   Weight: 66.7 kg (147 lb)   Height: 167.6 cm (66\")         06/26/24  1137   Weight: 66.7 kg (147 lb)     Body mass index is 23.73 kg/m².  General: No acute distress.  Resp: normal respiratory effort  Skin: no rashes or wounds; normal turgor  Psych: mood and affect appropriate; recent and remote memory intact          Physical Exam  Bilateral shoulders- Active forward flexion is 160 degrees with forward minus out of 5 strength, active external rotation 40 degrees with 4 out of 5 strength, " 4+ out of 5 strength. Belly press test is negative. Bear hug sign is positive. Empty can test is positive. Pittman and Neer's test is positive. Moderate tenderness along the anterior and posterior glenohumeral joint line with mild crepitus. Deltoid is firing in all three components.         Imaging:  Right Shoulder X-Ray  Indication: Pain  AP, scapular Y, and axillary lateral views    Findings:  No fracture  No bony lesion  Normal soft tissues  Advanced glenohumeral joint space narrowing with moderate subchondral sclerosis, minimal evidence of humeral head elevation, mild reactive osteophyte formation inferior glenoid and proximal medial calcar of humerus    Compared to prior office x-rays    Assessment:        1. Primary osteoarthritis of right shoulder    2. Primary osteoarthritis of left shoulder           Plan:  - Large Joint Arthrocentesis: bilateral subacromial bursa on 6/26/2024 12:05 PM  Indications: pain  Details: 22 G needle, superolateral approach  Medications (Right): 80 mg triamcinolone acetonide 40 MG/ML; 4 mL lidocaine PF 1% 1 %  Medications (Left): 80 mg triamcinolone acetonide 40 MG/ML; 4 mL lidocaine PF 1% 1 %  Outcome: tolerated well, no immediate complications  Procedure, treatment alternatives, risks and benefits explained, specific risks discussed. Immediately prior to procedure a time out was called to verify the correct patient, procedure, equipment, support staff and site/side marked as required. Patient was prepped and draped in the usual sterile fashion.               Assessment & Plan  Bilateral shoulder DJD  A comprehensive discussion was held with the patient regarding the potential benefits of reverse shoulder arthroplasty, considering his advanced arthritic changes and associated pain. However, he expressed satisfaction with his response to home exercises, therapy, and injections. It has been 3 months since his last injection, prompting a decision to proceed with this today. Bilateral  shoulder subacromial injections were administered today. He is advised to continue with home exercises for range of motion and strength. All his queries were addressed.    Follow-up  A follow-up appointment is scheduled for 3 months from now, or sooner if necessary.    Pardeep Suárez was in agreement with plan and had all questions answered.     Orders:  Orders Placed This Encounter   Procedures    - Large Joint Arthrocentesis: bilateral subacromial bursa    XR Shoulder 2+ View Right       Medications:  New Medications Ordered This Visit   Medications    meloxicam (MOBIC) 15 MG tablet     Sig: Take 1 tablet by mouth Daily.     Dispense:  30 tablet     Refill:  0       Followup:  Return in about 3 months (around 9/26/2024).    Diagnoses and all orders for this visit:    1. Primary osteoarthritis of right shoulder (Primary)  -     XR Shoulder 2+ View Right  -     - Large Joint Arthrocentesis: bilateral subacromial bursa    2. Primary osteoarthritis of left shoulder  -     - Large Joint Arthrocentesis: bilateral subacromial bursa    Other orders  -     meloxicam (MOBIC) 15 MG tablet; Take 1 tablet by mouth Daily.  Dispense: 30 tablet; Refill: 0          Dictated utilizing Dragon dictation     Patient or patient representative verbalized consent for the use of Ambient Listening during the visit with  Ashwin Turner MD for chart documentation. 6/26/2024  11:52 EDT

## 2024-09-30 ENCOUNTER — OFFICE VISIT (OUTPATIENT)
Dept: ORTHOPEDIC SURGERY | Facility: CLINIC | Age: 74
End: 2024-09-30
Payer: MEDICARE

## 2024-09-30 VITALS — BODY MASS INDEX: 22.34 KG/M2 | WEIGHT: 139 LBS | HEIGHT: 66 IN

## 2024-09-30 DIAGNOSIS — M19.012 PRIMARY OSTEOARTHRITIS OF LEFT SHOULDER: ICD-10-CM

## 2024-09-30 DIAGNOSIS — M19.011 PRIMARY OSTEOARTHRITIS OF RIGHT SHOULDER: Primary | ICD-10-CM

## 2024-09-30 PROCEDURE — 20610 DRAIN/INJ JOINT/BURSA W/O US: CPT | Performed by: ORTHOPAEDIC SURGERY

## 2024-09-30 PROCEDURE — 1159F MED LIST DOCD IN RCRD: CPT | Performed by: ORTHOPAEDIC SURGERY

## 2024-09-30 PROCEDURE — 1160F RVW MEDS BY RX/DR IN RCRD: CPT | Performed by: ORTHOPAEDIC SURGERY

## 2024-09-30 PROCEDURE — 99213 OFFICE O/P EST LOW 20 MIN: CPT | Performed by: ORTHOPAEDIC SURGERY

## 2024-09-30 RX ORDER — LIDOCAINE HYDROCHLORIDE 10 MG/ML
4 INJECTION, SOLUTION EPIDURAL; INFILTRATION; INTRACAUDAL; PERINEURAL
Status: COMPLETED | OUTPATIENT
Start: 2024-09-30 | End: 2024-09-30

## 2024-09-30 RX ORDER — TRIAMCINOLONE ACETONIDE 40 MG/ML
80 INJECTION, SUSPENSION INTRA-ARTICULAR; INTRAMUSCULAR
Status: COMPLETED | OUTPATIENT
Start: 2024-09-30 | End: 2024-09-30

## 2024-09-30 RX ADMIN — LIDOCAINE HYDROCHLORIDE 4 ML: 10 INJECTION, SOLUTION EPIDURAL; INFILTRATION; INTRACAUDAL; PERINEURAL at 10:58

## 2024-09-30 RX ADMIN — TRIAMCINOLONE ACETONIDE 80 MG: 40 INJECTION, SUSPENSION INTRA-ARTICULAR; INTRAMUSCULAR at 10:58

## 2024-09-30 NOTE — PROGRESS NOTES
Subjective:     Patient ID: Pardeep Suárez is a 74 y.o. male.    Chief Complaint:  Follow-up bilateral shoulder pain, DJD  Last injection-bilateral shoulder subacromial bursa-6/26/2024    History of Present Illness  History of Present Illness  The patient returns to clinic today for follow-up evaluation in regards to bilateral shoulder pain.    He reports that his last injections provided significant relief for approximately 2.5 months. However, over the past 1 to 2 weeks, he has experienced an increase in pain levels, which is symmetrical between both shoulders. He describes the pain as aching, which is exacerbated by prolonged overhead lifting, pushing, and pulling activities. He also experiences moderate pain at night. He denies any numbness, tingling, fevers, chills, or sweats.     Social History     Occupational History    Not on file   Tobacco Use    Smoking status: Every Day     Current packs/day: 0.25     Average packs/day: 0.3 packs/day for 50.0 years (12.5 ttl pk-yrs)     Types: Cigarettes     Passive exposure: Current    Smokeless tobacco: Never   Vaping Use    Vaping status: Never Used   Substance and Sexual Activity    Alcohol use: Not Currently    Drug use: No    Sexual activity: Not Currently     Partners: Female      Past Medical History:   Diagnosis Date    Actinic keratosis     Acute sinusitis     Allergic rhinitis     Anxiety     Arthritis of back     Arthritis of neck     Cellulitis of leg     Depression     Enlarged prostate without lower urinary tract symptoms (luts)     Exposure to the flu     Frozen shoulder     GERD (gastroesophageal reflux disease)     Glaucoma     Headache     Hernia 11-04-22    History of urinary stone     History of viral hepatitis, type B     Hyperlipidemia     Hypertension     Inability to attain erection     Infectious viral hepatitis     Insomnia     Lactose intolerance     Microscopic hematuria     Nasal polyps     Onychomycosis     Periarthritis of shoulder      "Rotator cuff syndrome 1970    Tennis elbow     Thoracic disc disorder      Past Surgical History:   Procedure Laterality Date    ACHILLES TENDON REPAIR Right     ANAL FISSURECTOMY/FISTULECTOMY      BREAST SURGERY      Cyst removed    COLONOSCOPY      Normal per patient    COLONOSCOPY N/A 11/13/2017    Procedure: COLONOSCOPY INTO CECUM AND TI WITH BX;  Surgeon: Renard Benoit MD;  Location: [facility] ENDOSCOPY;  Service:     COLONOSCOPY N/A 07/03/2023    Procedure: COLONOSCOPY to cecum into terminal ileum;  Surgeon: Renard Benoit MD;  Location: [facility] ENDOSCOPY;  Service: Gastroenterology;  Laterality: N/A;  pre- anemia, weight loss  post-diverticulosis, hemorrhoids    ENDOSCOPY N/A 07/03/2023    Procedure: ESOPHAGOGASTRODUODENOSCOPY;  Surgeon: Renard Benoit MD;  Location: [facility] ENDOSCOPY;  Service: Gastroenterology;  Laterality: N/A;  pre- anemia, weight loss  post-hiatal hernia    HEMORRHOIDECTOMY      SHOULDER SURGERY Right     SKIN CANCER EXCISION      TONSILLECTOMY         Family History   Problem Relation Age of Onset    Cancer Mother         Brain tumor    Cancer Father         Pancreatic cancer         Review of Systems        Objective:  Vitals:    09/30/24 1055   Weight: 63 kg (139 lb)   Height: 167.6 cm (66\")         09/30/24  1055   Weight: 63 kg (139 lb)     Body mass index is 22.44 kg/m².  General: No acute distress.  Resp: normal respiratory effort  Skin: no rashes or wounds; normal turgor  Psych: mood and affect appropriate; recent and remote memory intact          Physical Exam  Bilateral shoulders- active forward flexion is 155 degrees with 4- out of 5 strength, external rotation 45 degrees, 4+ out of 5 strength on belly press test with negative bear hug sign. Positive empty can test, positive Pittman and Neer's, positive drop arm test, negative external rotation lag sign, positive deltoid firing in all three components. Brisk cap refill to all digits.         Imaging:  None " today  Assessment:        1. Primary osteoarthritis of right shoulder    2. Primary osteoarthritis of left shoulder           Plan:      - Large Joint Arthrocentesis: bilateral subacromial bursa on 9/30/2024 10:58 AM  Indications: pain  Details: 22 G needle, lateral approach  Medications (Right): 4 mL lidocaine PF 1% 1 %; 80 mg triamcinolone acetonide 40 MG/ML  Medications (Left): 4 mL lidocaine PF 1% 1 %; 80 mg triamcinolone acetonide 40 MG/ML  Outcome: tolerated well, no immediate complications  Procedure, treatment alternatives, risks and benefits explained, specific risks discussed. Consent was given by the patient. Immediately prior to procedure a time out was called to verify the correct patient, procedure, equipment, support staff and site/side marked as required. Patient was prepped and draped in the usual sterile fashion.           Assessment & Plan  1. Bilateral shoulder pain.  I discussed options at length with patient including but not limited to reverse shoulder arthroplasty, but he wants to hold off on surgical treatment at this time and he has been fairly happy with the result from injection. He would like to proceed with repeat subacromial injection today. He will continue working on home exercise, range of motion, strength as tolerated. All questions answered.    Patient would like to proceed with cortisone injection today to the bilateral shoulder subacromial space. Recommended limited use of affected extremity for the next 24 hours to only essential activites other than work on general active and passive motion. Recommended supplementing with ice and soft tissue massage. Discussed with patient that they should see results in 5-7 days, if no improvement in 5-6 weeks I have asked them to call the office to review other options. Patient should call office immediately if they notice redness, warmth, fevers, chills, or residual numbness or tingling for greater than 6 hours after injection.        Follow-up  The patient will follow up in 3 months in [place] office with Jennifer Hagen or sooner if needed.    Pardeep Suáerz was in agreement with plan and had all questions answered.     Orders:  Orders Placed This Encounter   Procedures    - Large Joint Arthrocentesis: bilateral subacromial bursa       Medications:  No orders of the defined types were placed in this encounter.      Followup:  No follow-ups on file.    Diagnoses and all orders for this visit:    1. Primary osteoarthritis of right shoulder (Primary)  -     - Large Joint Arthrocentesis: bilateral subacromial bursa    2. Primary osteoarthritis of left shoulder  -     - Large Joint Arthrocentesis: bilateral subacromial bursa          BMI is within normal parameters. No other follow-up for BMI required.       Dictated utilizing Dragon dictation     Patient or patient representative verbalized consent for the use of Ambient Listening during the visit with  Ashwin Turner MD for chart documentation. 9/30/2024  11:06 EDT

## 2025-01-08 ENCOUNTER — OFFICE VISIT (OUTPATIENT)
Dept: ORTHOPEDIC SURGERY | Facility: CLINIC | Age: 75
End: 2025-01-08
Payer: MEDICARE

## 2025-01-08 VITALS — HEIGHT: 66 IN | WEIGHT: 139 LBS | BODY MASS INDEX: 22.34 KG/M2

## 2025-01-08 DIAGNOSIS — M19.011 PRIMARY OSTEOARTHRITIS, RIGHT SHOULDER: ICD-10-CM

## 2025-01-08 DIAGNOSIS — G89.29 CHRONIC RIGHT SHOULDER PAIN: ICD-10-CM

## 2025-01-08 DIAGNOSIS — M25.511 CHRONIC RIGHT SHOULDER PAIN: ICD-10-CM

## 2025-01-08 DIAGNOSIS — M19.012 PRIMARY OSTEOARTHRITIS OF LEFT SHOULDER: Primary | ICD-10-CM

## 2025-01-08 RX ORDER — LIDOCAINE HYDROCHLORIDE 10 MG/ML
4 INJECTION, SOLUTION EPIDURAL; INFILTRATION; INTRACAUDAL; PERINEURAL
Status: COMPLETED | OUTPATIENT
Start: 2025-01-08 | End: 2025-01-08

## 2025-01-08 RX ORDER — TRIAMCINOLONE ACETONIDE 40 MG/ML
80 INJECTION, SUSPENSION INTRA-ARTICULAR; INTRAMUSCULAR
Status: COMPLETED | OUTPATIENT
Start: 2025-01-08 | End: 2025-01-08

## 2025-01-08 RX ADMIN — LIDOCAINE HYDROCHLORIDE 4 ML: 10 INJECTION, SOLUTION EPIDURAL; INFILTRATION; INTRACAUDAL; PERINEURAL at 14:38

## 2025-01-08 RX ADMIN — TRIAMCINOLONE ACETONIDE 80 MG: 40 INJECTION, SUSPENSION INTRA-ARTICULAR; INTRAMUSCULAR at 14:38

## 2025-01-08 NOTE — PROGRESS NOTES
Subjective:     Patient ID: Pardeep Suárez is a 74 y.o. male.    Chief Complaint:  Bilateral shoulder pain, new patient to examiner  History of Present Illness  History of Present Illness  The patient is a 74-year-old male who presents to the clinic today for evaluation of bilateral shoulder pain. He is a new patient to examiner, has been seen by Dr. Bergman in the past, however, this is his first visit with myself.    He reports experiencing pain along the posterior lateral aspect of both shoulders, which radiates into his trapezial muscle. He has had prior injury to the right shoulder in the past approximately 50 years ago with a shoulder separation with repair. He is experiencing pain bilaterally. He does not experience any pain at the front of his shoulders but reports significantly reduced range of motion when reaching behind his back with his right arm. He also experiences pain during prolonged overhead lifting, pushing, and pulling activities. The pain is moderate in nature, particularly at night. He does not experience any numbness or tingling in the shoulders. He is uncertain about any previous x-ray imaging of his right shoulder. He reports no other concerns at present. He has received injections in the past, which did seem to provide him some symptom improvement. Most recent injections were administered on 09/30/2024, providing symptom relief for approximately 2-1/2 months.       Social History     Occupational History    Not on file   Tobacco Use    Smoking status: Every Day     Current packs/day: 0.25     Average packs/day: 0.3 packs/day for 50.0 years (12.5 ttl pk-yrs)     Types: Cigarettes     Passive exposure: Current    Smokeless tobacco: Never   Vaping Use    Vaping status: Never Used   Substance and Sexual Activity    Alcohol use: Not Currently    Drug use: No    Sexual activity: Not Currently     Partners: Female      Past Medical History:   Diagnosis Date    Actinic keratosis     Acute sinusitis      Allergic rhinitis     Anxiety     Arthritis of back     Arthritis of neck     Cellulitis of leg     Depression     Enlarged prostate without lower urinary tract symptoms (luts)     Exposure to the flu     Frozen shoulder     GERD (gastroesophageal reflux disease)     Glaucoma     Headache     Hernia 11-04-22    History of urinary stone     History of viral hepatitis, type B     Hyperlipidemia     Hypertension     Inability to attain erection     Infectious viral hepatitis     Insomnia     Lactose intolerance     Microscopic hematuria     Nasal polyps     Onychomycosis     Periarthritis of shoulder     Rotator cuff syndrome 1970    Tennis elbow     Thoracic disc disorder      Past Surgical History:   Procedure Laterality Date    ACHILLES TENDON REPAIR Right     ANAL FISSURECTOMY/FISTULECTOMY      BREAST SURGERY      Cyst removed    COLONOSCOPY      Normal per patient    COLONOSCOPY N/A 11/13/2017    Procedure: COLONOSCOPY INTO CECUM AND TI WITH BX;  Surgeon: Renard Benoit MD;  Location: [facility] ENDOSCOPY;  Service:     COLONOSCOPY N/A 07/03/2023    Procedure: COLONOSCOPY to cecum into terminal ileum;  Surgeon: Renard Benoit MD;  Location: [facility] ENDOSCOPY;  Service: Gastroenterology;  Laterality: N/A;  pre- anemia, weight loss  post-diverticulosis, hemorrhoids    ENDOSCOPY N/A 07/03/2023    Procedure: ESOPHAGOGASTRODUODENOSCOPY;  Surgeon: Renard Benoit MD;  Location: [facility] ENDOSCOPY;  Service: Gastroenterology;  Laterality: N/A;  pre- anemia, weight loss  post-hiatal hernia    HEMORRHOIDECTOMY      SHOULDER SURGERY Right     SKIN CANCER EXCISION      TONSILLECTOMY      TRIGGER POINT INJECTION  2023       Family History   Problem Relation Age of Onset    Cancer Mother         Brain tumor    Cancer Father         Pancreatic cancer               Objective:  Physical Exam    General: No acute distress.  Eyes: conjunctiva clear; pupils equally round and reactive  ENT: external ears and nose  "atraumatic; oropharynx clear  CV: no peripheral edema  Resp: normal respiratory effort  Skin: no rashes or wounds; normal turgor  Psych: mood and affect appropriate; recent and remote memory intact    Vitals:    01/08/25 1346   Weight: 63 kg (139 lb)   Height: 167.6 cm (66\")         01/08/25  1346   Weight: 63 kg (139 lb)     Body mass index is 22.44 kg/m².      Ortho Exam     Physical Exam  Right shoulder exam  Active forward flexion 165 degrees  External rotation 70 degrees  Internal rotation to side approaching L5  Internal/external rotation strength 4+ out of 5  Bicep strength 4 out of 5  Supraspinatus strength 4out of 5   Subscapularis strength belly press exam 4 out of 5  Minimally positive Pittman exam, minimally positive Neer's exam  Positive clicking along the AC joint, clavicle rises with external and internal rotation   Positive crossarm exam  Negative drop arm exam  Negative empty can exam  Negative Pittman  Negative speeds    Left shoulder exam  Active forward flexion 170 degrees  External rotation 85 degrees  Internal rotation L1  Internal/external rotation strength 4+ out of 5  Bicep strength out of 5  Supraspinatus strength 4 out of 5  Subscapularis to belly press exam strength 4 out of 5  Minimally positive Pittman, minimally positive Neer's  Negative crossarm exam  Negative drop arm exam  Negative empty can exam    Imaging:  Bilateral Shoulder X-Ray  Indication: Pain  AP Internal and External Rotation views    Findings:  No fracture  No bony lesion  Normal soft tissues  Glenohumeral degeneration bilaterally greater right than left, AC joint degeneration, clavicle slightly elevated right upper extremity    prior studies were available for comparison.    Assessment:        1. Primary osteoarthritis of left shoulder    2. Chronic right shoulder pain    3. Primary osteoarthritis, right shoulder         Assessment & Plan  1. Bilateral shoulder pain.  He reports pain along the posterior lateral aspect of " both shoulders, radiating into the trapezial muscle, with significantly reduced range of motion in the right upper extremity when reaching behind his back. Pain is moderate in nature, especially at night, with no numbness or tingling. Prior x-ray imaging was completed in the office. Treatment options discussed include proceeding with a corticosteroid injection via the subacromial approach, which has provided significant symptom improvement in the past. He wishes to proceed with the injections. It was discussed that it may take 3 to 7 days before he notices any significant symptom improvement. Application of ice to the injection site this evening is recommended. X-ray imaging of bilateral shoulders was conducted.  We did review the x-ray imaging glenohumeral degeneration right shoulder advancement, we did discuss options in future including proceeding with corticosteroid injection via glenohumeral approach as an option if the steroid injections are not relieving the symptoms for the 3-month interval.  Will gladly see him back in clinic sooner if needed.  All question answered.    Follow-up  The patient will follow up in 3 months as needed. He is to call with any questions or concerns. All questions answered.    PROCEDURE  The patient received shoulder injections on 09/30/2024, which provided symptom relief for approximately 2.5 months.    Plan:  - Large Joint Arthrocentesis: bilateral subacromial bursa on 1/8/2025 2:38 PM  Indications: pain  Details: 22 G needle, lateral approach  Medications (Right): 4 mL lidocaine PF 1% 1 %; 80 mg triamcinolone acetonide 40 MG/ML  Medications (Left): 4 mL lidocaine PF 1% 1 %; 80 mg triamcinolone acetonide 40 MG/ML  Outcome: tolerated well, no immediate complications  Procedure, treatment alternatives, risks and benefits explained, specific risks discussed. Consent was given by the patient. Immediately prior to procedure a time out was called to verify the correct patient,  procedure, equipment, support staff and site/side marked as required. Patient was prepped and draped in the usual sterile fashion.         Orders:  Orders Placed This Encounter   Procedures    - Large Joint Arthrocentesis: bilateral subacromial bursa    XR Shoulder 2+ View Left    XR Shoulder 2+ View Right     No orders of the defined types were placed in this encounter.      Dragon dictation utilized  BMI is within normal parameters. No other follow-up for BMI required.       Patient or patient representative verbalized consent for the use of Ambient Listening during the visit with  LACEY Thomas for chart documentation. 1/8/2025  14:50 EST

## 2025-04-08 ENCOUNTER — OFFICE VISIT (OUTPATIENT)
Dept: ORTHOPEDIC SURGERY | Facility: CLINIC | Age: 75
End: 2025-04-08
Payer: MEDICARE

## 2025-04-08 VITALS — HEIGHT: 66 IN | BODY MASS INDEX: 22.34 KG/M2 | WEIGHT: 139 LBS

## 2025-04-08 DIAGNOSIS — M19.011 PRIMARY OSTEOARTHRITIS, RIGHT SHOULDER: ICD-10-CM

## 2025-04-08 DIAGNOSIS — M19.012 PRIMARY OSTEOARTHRITIS OF LEFT SHOULDER: Primary | ICD-10-CM

## 2025-04-08 RX ADMIN — LIDOCAINE HYDROCHLORIDE 4 ML: 10 INJECTION, SOLUTION EPIDURAL; INFILTRATION; INTRACAUDAL; PERINEURAL at 13:05

## 2025-04-08 RX ADMIN — TRIAMCINOLONE ACETONIDE 80 MG: 40 INJECTION, SUSPENSION INTRA-ARTICULAR; INTRAMUSCULAR at 13:05

## 2025-04-08 NOTE — PROGRESS NOTES
Subjective:     Patient ID: Pardeep Suárez is a 74 y.o. male.    Chief Complaint:  Follow-up DJD bilateral shoulders  History of Present Illness  History of Present Illness  The patient is a 74-year-old male who presents to the clinic today for reevaluation of bilateral shoulders.    He is experiencing pain in the lateral aspect of his shoulders, accompanied by clicking and popping sounds in the anterior aspect. Approximately 3 weeks ago, he began experiencing a recurrence of shoulder pain, along with the clicking and popping sounds. The pain is more pronounced when reaching overhead or out to the side. He also reports pain when reaching behind his back, which is more severe on the left side but also present on the right. He reports no other concerns at this time. He has previously received corticosteroid injections, which provided mild symptom relief for several weeks.         Social History     Occupational History    Not on file   Tobacco Use    Smoking status: Every Day     Current packs/day: 0.25     Average packs/day: 0.3 packs/day for 50.0 years (12.5 ttl pk-yrs)     Types: Cigarettes     Passive exposure: Current    Smokeless tobacco: Never   Vaping Use    Vaping status: Never Used   Substance and Sexual Activity    Alcohol use: Not Currently    Drug use: No    Sexual activity: Not Currently     Partners: Female      Past Medical History:   Diagnosis Date    Actinic keratosis     Acute sinusitis     Allergic rhinitis     Anxiety     Arthritis of back     Arthritis of neck     Cellulitis of leg     Depression     Enlarged prostate without lower urinary tract symptoms (luts)     Exposure to the flu     Frozen shoulder     GERD (gastroesophageal reflux disease)     Glaucoma     Headache     Hernia 11-04-22    History of urinary stone     History of viral hepatitis, type B     Hyperlipidemia     Hypertension     Inability to attain erection     Infectious viral hepatitis     Insomnia     Lactose intolerance      "Microscopic hematuria     Nasal polyps     Onychomycosis     Periarthritis of shoulder     Rotator cuff syndrome 1970    Tennis elbow     Thoracic disc disorder      Past Surgical History:   Procedure Laterality Date    ACHILLES TENDON REPAIR Right     ANAL FISSURECTOMY/FISTULECTOMY      BREAST SURGERY      Cyst removed    COLONOSCOPY      Normal per patient    COLONOSCOPY N/A 11/13/2017    Procedure: COLONOSCOPY INTO CECUM AND TI WITH BX;  Surgeon: Renard Benoit MD;  Location: [facility] ENDOSCOPY;  Service:     COLONOSCOPY N/A 07/03/2023    Procedure: COLONOSCOPY to cecum into terminal ileum;  Surgeon: Renard Benoit MD;  Location: [facility] ENDOSCOPY;  Service: Gastroenterology;  Laterality: N/A;  pre- anemia, weight loss  post-diverticulosis, hemorrhoids    ENDOSCOPY N/A 07/03/2023    Procedure: ESOPHAGOGASTRODUODENOSCOPY;  Surgeon: Renard Benoit MD;  Location: [facility] ENDOSCOPY;  Service: Gastroenterology;  Laterality: N/A;  pre- anemia, weight loss  post-hiatal hernia    HEMORRHOIDECTOMY      SHOULDER SURGERY Right     SKIN CANCER EXCISION      TONSILLECTOMY      TRIGGER POINT INJECTION  2023       Family History   Problem Relation Age of Onset    Cancer Mother         Brain tumor    Cancer Father         Pancreatic cancer               Objective:  Physical Exam  General: No acute distress.  Eyes: conjunctiva clear; pupils equally round and reactive  ENT: external ears and nose atraumatic; oropharynx clear  CV: no peripheral edema  Resp: normal respiratory effort  Skin: no rashes or wounds; normal turgor  Psych: mood and affect appropriate; recent and remote memory intact    Vitals:    04/08/25 1303   Weight: 63 kg (139 lb)   Height: 167.6 cm (66\")         04/08/25  1303   Weight: 63 kg (139 lb)     Body mass index is 22.44 kg/m².      Ortho Exam     Physical Exam  Bilateral shoulders examined active active forward flexion 165 degrees  External rotation 50 degrees  Internal rotation " L4  Internal/external rotation strength 4- out of 5  Bicep strength 4- out of 5  Positive crepitus throughout arc of motion  Pain with active forward flexion and external rotation maximal tenderness present anterior aspect of the shoulder, lateral acromion  Positive deltoid firing  Positive sensation light touch all distributions bilateral upper extremities  Significant tenderness after examination    Assessment:        1. Primary osteoarthritis of left shoulder    2. Primary osteoarthritis, right shoulder         Assessment & Plan  1. Bilateral shoulder pain.  The patient reports pain in the lateral aspect of both shoulders, with clicking and popping in the anterior aspect. Pain is increased with reaching overhead, reaching out to the side, and reaching behind the back, particularly on the left side. He previously received corticosteroid injections with mild symptom improvement for several weeks. A plan of care was discussed with the patient, including the option of repeat corticosteroid injections via a bilateral shoulder subacromial approach. He is advised to apply ice to the injection site this evening. If symptoms persist, a follow-up in 6 weeks will be scheduled to consider proceeding with a corticosteroid injection via a glenohumeral approach. All questions were answered.    Follow-up  The patient will follow up in 6 weeks.    PROCEDURE  The patient has previously received corticosteroid injections for bilateral shoulder pain.    Plan:    - Large Joint Arthrocentesis: bilateral subacromial bursa on 4/8/2025 1:05 PM  Indications: pain  Details: 22 G needle, lateral approach  Medications (Right): 80 mg triamcinolone acetonide 40 MG/ML; 4 mL lidocaine PF 1% 1 %  Medications (Left): 4 mL lidocaine PF 1% 1 %; 80 mg triamcinolone acetonide 40 MG/ML  Outcome: tolerated well, no immediate complications  Procedure, treatment alternatives, risks and benefits explained, specific risks discussed. Consent was given by the  patient. Immediately prior to procedure a time out was called to verify the correct patient, procedure, equipment, support staff and site/side marked as required. Patient was prepped and draped in the usual sterile fashion.         Orders:  Orders Placed This Encounter   Procedures    - Large Joint Arthrocentesis: bilateral subacromial bursa     No orders of the defined types were placed in this encounter.        Dragon dictation utilized  BMI is within normal parameters. No other follow-up for BMI required.       Patient or patient representative verbalized consent for the use of Ambient Listening during the visit with  LACEY Thomas for chart documentation. 4/10/2025  13:51 EDT

## 2025-04-10 RX ORDER — TRIAMCINOLONE ACETONIDE 40 MG/ML
80 INJECTION, SUSPENSION INTRA-ARTICULAR; INTRAMUSCULAR
Status: COMPLETED | OUTPATIENT
Start: 2025-04-08 | End: 2025-04-08

## 2025-04-10 RX ORDER — LIDOCAINE HYDROCHLORIDE 10 MG/ML
4 INJECTION, SOLUTION EPIDURAL; INFILTRATION; INTRACAUDAL; PERINEURAL
Status: COMPLETED | OUTPATIENT
Start: 2025-04-08 | End: 2025-04-08

## 2025-05-20 ENCOUNTER — OFFICE VISIT (OUTPATIENT)
Dept: ORTHOPEDIC SURGERY | Facility: CLINIC | Age: 75
End: 2025-05-20
Payer: MEDICARE

## 2025-05-20 VITALS — HEIGHT: 66 IN | WEIGHT: 143.2 LBS | BODY MASS INDEX: 23.01 KG/M2

## 2025-05-20 DIAGNOSIS — M19.011 PRIMARY OSTEOARTHRITIS, RIGHT SHOULDER: ICD-10-CM

## 2025-05-20 DIAGNOSIS — M19.012 PRIMARY OSTEOARTHRITIS OF LEFT SHOULDER: Primary | ICD-10-CM

## 2025-05-20 PROCEDURE — 99024 POSTOP FOLLOW-UP VISIT: CPT | Performed by: NURSE PRACTITIONER

## 2025-05-20 NOTE — PROGRESS NOTES
Subjective:     Patient ID: Pardeep Suárez is a 74 y.o. male.    Chief Complaint:  Follow-up DJD bilateral shoulders  History of Present Illness  History of Present Illness  The patient is a 74-year-old male who returns to the clinic today for a follow-up on his bilateral shoulder condition. He received a corticosteroid injection 6 weeks ago for the treatment of bilateral shoulder subacromial aspect. He has done quite well. He presents to the clinic today as we are going to trial a different type of injection to the shoulder or shoulders to see if this gives him any greater symptom improvement.    He reports significant improvement in his condition following the previous treatment. However, he recently experienced shoulder and shoulder blade pain during a driving trip to [place]. He does not report any known injury or other concerns at present.       Social History     Occupational History    Not on file   Tobacco Use    Smoking status: Some Days     Current packs/day: 0.25     Average packs/day: 0.3 packs/day for 50.0 years (12.5 ttl pk-yrs)     Types: Cigarettes     Passive exposure: Current    Smokeless tobacco: Never   Vaping Use    Vaping status: Never Used   Substance and Sexual Activity    Alcohol use: Not Currently    Drug use: No    Sexual activity: Not Currently     Partners: Female      Past Medical History:   Diagnosis Date    Actinic keratosis     Acute sinusitis     Allergic rhinitis     Anxiety     Arthritis of back     Arthritis of neck     Cellulitis of leg     Depression     Enlarged prostate without lower urinary tract symptoms (luts)     Exposure to the flu     Frozen shoulder     GERD (gastroesophageal reflux disease)     Glaucoma     Headache     Hernia 11-04-22    History of urinary stone     History of viral hepatitis, type B     Hyperlipidemia     Hypertension     Inability to attain erection     Infectious viral hepatitis     Insomnia     Lactose intolerance     Microscopic  "hematuria     Nasal polyps     Onychomycosis     Periarthritis of shoulder     Rotator cuff syndrome 1970    Tennis elbow     Thoracic disc disorder      Past Surgical History:   Procedure Laterality Date    ACHILLES TENDON REPAIR Right     ANAL FISSURECTOMY/FISTULECTOMY      BREAST SURGERY      Cyst removed    COLONOSCOPY      Normal per patient    COLONOSCOPY N/A 11/13/2017    Procedure: COLONOSCOPY INTO CECUM AND TI WITH BX;  Surgeon: Renard Benoit MD;  Location: [facility] ENDOSCOPY;  Service:     COLONOSCOPY N/A 07/03/2023    Procedure: COLONOSCOPY to cecum into terminal ileum;  Surgeon: Renard Benoit MD;  Location: [facility] ENDOSCOPY;  Service: Gastroenterology;  Laterality: N/A;  pre- anemia, weight loss  post-diverticulosis, hemorrhoids    ENDOSCOPY N/A 07/03/2023    Procedure: ESOPHAGOGASTRODUODENOSCOPY;  Surgeon: Renard Benoit MD;  Location: [facility] ENDOSCOPY;  Service: Gastroenterology;  Laterality: N/A;  pre- anemia, weight loss  post-hiatal hernia    HEMORRHOIDECTOMY      SHOULDER SURGERY Right     SKIN CANCER EXCISION      TONSILLECTOMY      TRIGGER POINT INJECTION  2023       Family History   Problem Relation Age of Onset    Cancer Mother         Brain tumor    Cancer Father         Pancreatic cancer               Objective:  Physical Exam    General: No acute distress.  Eyes: conjunctiva clear; pupils equally round and reactive  ENT: external ears and nose atraumatic; oropharynx clear  CV: no peripheral edema  Resp: normal respiratory effort  Skin: no rashes or wounds; normal turgor  Psych: mood and affect appropriate; recent and remote memory intact    Vitals:    05/20/25 1345   Weight: 65 kg (143 lb 3.2 oz)   Height: 167.6 cm (65.98\")         05/20/25  1345   Weight: 65 kg (143 lb 3.2 oz)     Body mass index is 23.12 kg/m².      Ortho Exam     Physical Exam  Bilateral shoulders were examined. Active forward flexion is 165 degrees, external rotation is 50 degrees, and internal rotation " is to the side. There is palpable tenderness along the anterior glenohumeral aspect. Drop arm exam is negative. Florence's test is positive. Positive crepitus throughout arc of motion. Positive deltoid firing.      Assessment:        1. Primary osteoarthritis of left shoulder    2. Primary osteoarthritis, right shoulder         Assessment & Plan  1. Bilateral shoulder pain.  A comprehensive discussion was held regarding potential treatment strategies. The option of administering corticosteroid injections to address the anterior degeneration of the glenohumeral joint was explored. He is advised to apply ice to the injection site this evening. All queries were addressed satisfactorily. If symptoms persist, we could look at proceeding with corticosteroid injections along the subacromial aspect, but would prefer to reevaluate first.    Follow-up  The patient will follow up in 6 weeks.    PROCEDURE  Corticosteroid injection was administered 6 weeks ago for treatment of bilateral shoulder subacromial aspect.      Orders:  No orders of the defined types were placed in this encounter.    No orders of the defined types were placed in this encounter.          Dragon dictation utilized  BMI is within normal parameters. No other follow-up for BMI required.       Patient or patient representative verbalized consent for the use of Ambient Listening during the visit with  LACEY Thomas for chart documentation. 5/22/2025  14:53 EDT

## 2025-07-01 ENCOUNTER — OFFICE VISIT (OUTPATIENT)
Dept: ORTHOPEDIC SURGERY | Facility: CLINIC | Age: 75
End: 2025-07-01
Payer: MEDICARE

## 2025-07-01 VITALS
BODY MASS INDEX: 22.82 KG/M2 | DIASTOLIC BLOOD PRESSURE: 65 MMHG | WEIGHT: 142 LBS | HEIGHT: 66 IN | SYSTOLIC BLOOD PRESSURE: 105 MMHG | HEART RATE: 55 BPM

## 2025-07-01 DIAGNOSIS — M19.012 PRIMARY OSTEOARTHRITIS OF LEFT SHOULDER: Primary | ICD-10-CM

## 2025-07-01 DIAGNOSIS — M19.011 PRIMARY OSTEOARTHRITIS, RIGHT SHOULDER: ICD-10-CM

## 2025-07-01 RX ADMIN — LIDOCAINE HYDROCHLORIDE 4 ML: 10 INJECTION, SOLUTION EPIDURAL; INFILTRATION; INTRACAUDAL; PERINEURAL at 13:48

## 2025-07-01 RX ADMIN — TRIAMCINOLONE ACETONIDE 80 MG: 40 INJECTION, SUSPENSION INTRA-ARTICULAR; INTRAMUSCULAR at 13:48

## 2025-07-01 NOTE — PROGRESS NOTES
Subjective:     Patient ID: Pardeep Suárez is a 75 y.o. male.    Chief Complaint:  Follow-up DJD bilateral shoulders  History of Present Illness  History of Present Illness  The patient is a 75-year-old male who presents to the clinic today for reevaluation of bilateral shoulders. At his last visit, he received corticosteroid injection, glenohumeral approach, which seemed to provide him significant symptom improvement. He is exhibiting increased pain along the lateral aspect of the shoulders. He did receive injections there 3 months ago. At this point, he is doing fairly well, pleased with symptom improvement thus far with the injections. He is also completing soft tissue massage which does seem to help provide him symptom improvement with his shoulders as well. He reports no other concerns.    He reports increased pain along the lateral aspect of his shoulders. He is also undergoing soft tissue massage, which seems to alleviate his shoulder symptoms.    Social History     Occupational History   • Not on file   Tobacco Use   • Smoking status: Some Days     Current packs/day: 0.25     Average packs/day: 0.3 packs/day for 50.0 years (12.5 ttl pk-yrs)     Types: Cigarettes     Passive exposure: Current   • Smokeless tobacco: Never   Vaping Use   • Vaping status: Never Used   Substance and Sexual Activity   • Alcohol use: Not Currently   • Drug use: No   • Sexual activity: Not Currently     Partners: Female      Past Medical History:   Diagnosis Date   • Actinic keratosis    • Acute sinusitis    • Allergic rhinitis    • Anxiety    • Arthritis of back    • Arthritis of neck    • Cellulitis of leg    • Depression    • Enlarged prostate without lower urinary tract symptoms (luts)    • Exposure to the flu    • Frozen shoulder    • GERD (gastroesophageal reflux disease)    • Glaucoma    • Headache    • Hernia 11-04-22   • History of urinary stone    • History of viral hepatitis, type B    • Hyperlipidemia    • Hypertension   "  • Inability to attain erection    • Infectious viral hepatitis    • Insomnia    • Lactose intolerance    • Microscopic hematuria    • Nasal polyps    • Onychomycosis    • Periarthritis of shoulder    • Rotator cuff syndrome 1970   • Tennis elbow    • Thoracic disc disorder      Past Surgical History:   Procedure Laterality Date   • ACHILLES TENDON REPAIR Right    • ANAL FISSURECTOMY/FISTULECTOMY     • BREAST SURGERY      Cyst removed   • COLONOSCOPY      Normal per patient   • COLONOSCOPY N/A 11/13/2017    Procedure: COLONOSCOPY INTO CECUM AND TI WITH BX;  Surgeon: Renard Benoit MD;  [facility];  Service:    • COLONOSCOPY N/A 07/03/2023    Procedure: COLONOSCOPY to cecum into terminal ileum;  Surgeon: Renard Benoit MD;  Location: [facility] ENDOSCOPY;  Service: Gastroenterology;  Laterality: N/A;  pre- anemia, weight loss  post-diverticulosis, hemorrhoids   • ENDOSCOPY N/A 07/03/2023    Procedure: ESOPHAGOGASTRODUODENOSCOPY;  Surgeon: Renard Benoit MD;  Location: [facility] ENDOSCOPY;  Service: Gastroenterology;  Laterality: N/A;  pre- anemia, weight loss  post-hiatal hernia   • HEMORRHOIDECTOMY     • SHOULDER SURGERY Right    • SKIN CANCER EXCISION     • TONSILLECTOMY     • TRIGGER POINT INJECTION  2023       Family History   Problem Relation Age of Onset   • Cancer Mother         Brain tumor   • Cancer Father         Pancreatic cancer               Objective:  Physical Exam    Vital signs reviewed.   General: No acute distress.  Eyes: conjunctiva clear; pupils equally round and reactive  ENT: external ears and nose atraumatic; oropharynx clear  CV: no peripheral edema  Resp: normal respiratory effort  Skin: no rashes or wounds; normal turgor  Psych: mood and affect appropriate; recent and remote memory intact    Vitals:    07/01/25 1323   BP: 105/65   Pulse: 55   Weight: 64.4 kg (142 lb)   Height: 167.6 cm (65.98\")         07/01/25  1323   Weight: 64.4 kg (142 lb)     Body mass index " is 22.93 kg/m².      Ortho Exam     Physical Exam  Bilateral shoulders show active forward flexion of 175 degrees, external rotation of 50 degrees, internal rotation to L3. Internal and external rotation strength is 4- out of 5. Positive deltoid firing, positive sensation to light touch distribution in bilateral upper extremities. Maximal tenderness is present along the lateral aspect of the shoulders.      Assessment:        1. Primary osteoarthritis of left shoulder    2. Primary osteoarthritis, right shoulder         Assessment & Plan  1. Bilateral shoulder pain.  He reports increased pain along the lateral aspect of the shoulders. Physical examination reveals maximal tenderness in these areas. He has been using soft tissue massage, which provides some relief. Treatment options were discussed, including proceeding with corticosteroid injections versus holding off. He opted for repeat corticosteroid injections via the subacromial approach. Ice application to the injection site is recommended. He is encouraged to continue with activity as tolerated.    Follow-up  The patient will follow up in 3 months.    PROCEDURE  Corticosteroid injection was administered into the glenohumeral joint 3 months ago.    Plan:  - Large Joint Arthrocentesis: bilateral subacromial bursa on 7/1/2025 1:48 PM  Indications: pain  Details: 22 G needle, lateral approach  Medications (Right): 80 mg triamcinolone acetonide 40 MG/ML; 4 mL lidocaine PF 1% 1 %  Medications (Left): 80 mg triamcinolone acetonide 40 MG/ML; 4 mL lidocaine PF 1% 1 %  Outcome: tolerated well, no immediate complications  Procedure, treatment alternatives, risks and benefits explained, specific risks discussed. Consent was given by the patient. Immediately prior to procedure a time out was called to verify the correct patient, procedure, equipment, support staff and site/side marked as required. Patient was prepped and draped in the usual sterile fashion.          Orders:  Orders Placed This Encounter   Procedures   • - Large Joint Arthrocentesis: bilateral subacromial bursa     No orders of the defined types were placed in this encounter.      Dragon dictation utilized  BMI is within normal parameters. No other follow-up for BMI required.       Patient or patient representative verbalized consent for the use of Ambient Listening during the visit with  LACEY Thomas for chart documentation. 7/3/2025  19:14 EDT

## 2025-07-03 RX ORDER — TRIAMCINOLONE ACETONIDE 40 MG/ML
80 INJECTION, SUSPENSION INTRA-ARTICULAR; INTRAMUSCULAR
Status: COMPLETED | OUTPATIENT
Start: 2025-07-01 | End: 2025-07-01

## 2025-07-03 RX ORDER — LIDOCAINE HYDROCHLORIDE 10 MG/ML
4 INJECTION, SOLUTION EPIDURAL; INFILTRATION; INTRACAUDAL; PERINEURAL
Status: COMPLETED | OUTPATIENT
Start: 2025-07-01 | End: 2025-07-01

## (undated) DEVICE — TUBING, SUCTION, 1/4" X 10', STRAIGHT: Brand: MEDLINE

## (undated) DEVICE — SINGLE-USE BIOPSY FORCEPS: Brand: RADIAL JAW 4

## (undated) DEVICE — TBG 02 CRUSH RESIST LF CLR 7FT

## (undated) DEVICE — CANN NASL CO2 TRULINK W/O2 A/

## (undated) DEVICE — Device: Brand: DEFENDO AIR/WATER/SUCTION AND BIOPSY VALVE

## (undated) DEVICE — THE TORRENT IRRIGATION SCOPE CONNECTOR IS USED WITH THE TORRENT IRRIGATION TUBING TO PROVIDE IRRIGATION FLUIDS SUCH AS STERILE WATER DURING GASTROINTESTINAL ENDOSCOPIC PROCEDURES WHEN USED IN CONJUNCTION WITH AN IRRIGATION PUMP (OR ELECTROSURGICAL UNIT).: Brand: TORRENT